# Patient Record
Sex: FEMALE | Race: BLACK OR AFRICAN AMERICAN | Employment: OTHER | URBAN - METROPOLITAN AREA
[De-identification: names, ages, dates, MRNs, and addresses within clinical notes are randomized per-mention and may not be internally consistent; named-entity substitution may affect disease eponyms.]

---

## 2017-01-04 ENCOUNTER — HOSPITAL ENCOUNTER (OUTPATIENT)
Dept: GENERAL RADIOLOGY | Age: 82
Discharge: HOME | End: 2017-01-04
Attending: INTERNAL MEDICINE
Payer: MEDICARE

## 2017-01-04 DIAGNOSIS — M25.551 RIGHT HIP PAIN: ICD-10-CM

## 2017-01-04 PROCEDURE — 73501 X-RAY EXAM HIP UNI 1 VIEW: CPT

## 2017-01-14 ENCOUNTER — HOSPITAL ENCOUNTER (EMERGENCY)
Age: 82
Discharge: HOME | End: 2017-01-14
Attending: INTERNAL MEDICINE | Admitting: INTERNAL MEDICINE
Payer: MEDICARE

## 2017-01-14 ENCOUNTER — APPOINTMENT (OUTPATIENT)
Dept: GENERAL RADIOLOGY | Age: 82
End: 2017-01-14
Attending: PHYSICIAN ASSISTANT
Payer: MEDICARE

## 2017-01-14 VITALS
HEART RATE: 70 BPM | TEMPERATURE: 98.7 F | WEIGHT: 198 LBS | DIASTOLIC BLOOD PRESSURE: 75 MMHG | SYSTOLIC BLOOD PRESSURE: 174 MMHG | HEIGHT: 64 IN | OXYGEN SATURATION: 100 % | BODY MASS INDEX: 33.8 KG/M2 | RESPIRATION RATE: 16 BRPM

## 2017-01-14 DIAGNOSIS — N39.0 UTI (URINARY TRACT INFECTION), UNCOMPLICATED: ICD-10-CM

## 2017-01-14 DIAGNOSIS — R10.84 ABDOMINAL PAIN, GENERALIZED: Primary | ICD-10-CM

## 2017-01-14 LAB
ALBUMIN SERPL BCP-MCNC: 3.6 G/DL (ref 3.4–5)
ALBUMIN/GLOB SERPL: 0.8 {RATIO} (ref 0.8–1.7)
ALP SERPL-CCNC: 136 U/L (ref 45–117)
ALT SERPL-CCNC: 21 U/L (ref 13–56)
ANION GAP BLD CALC-SCNC: 5 MMOL/L (ref 3–18)
APPEARANCE UR: CLEAR
AST SERPL W P-5'-P-CCNC: 18 U/L (ref 15–37)
BACTERIA URNS QL MICRO: ABNORMAL /HPF
BASOPHILS # BLD AUTO: 0 K/UL (ref 0–0.06)
BASOPHILS # BLD: 0 % (ref 0–2)
BILIRUB SERPL-MCNC: 0.3 MG/DL (ref 0.2–1)
BILIRUB UR QL: NEGATIVE
BUN SERPL-MCNC: 24 MG/DL (ref 7–18)
BUN/CREAT SERPL: 20 (ref 12–20)
CALCIUM SERPL-MCNC: 8.7 MG/DL (ref 8.5–10.1)
CHLORIDE SERPL-SCNC: 95 MMOL/L (ref 100–108)
CK MB CFR SERPL CALC: 1 % (ref 0–4)
CK MB SERPL-MCNC: 0.8 NG/ML (ref 0.5–3.6)
CK SERPL-CCNC: 79 U/L (ref 26–192)
CO2 SERPL-SCNC: 32 MMOL/L (ref 21–32)
COLOR UR: YELLOW
CREAT SERPL-MCNC: 1.18 MG/DL (ref 0.6–1.3)
DIFFERENTIAL METHOD BLD: ABNORMAL
EOSINOPHIL # BLD: 0.1 K/UL (ref 0–0.4)
EOSINOPHIL NFR BLD: 1 % (ref 0–5)
EPITH CASTS URNS QL MICRO: ABNORMAL /LPF (ref 0–5)
ERYTHROCYTE [DISTWIDTH] IN BLOOD BY AUTOMATED COUNT: 25.8 % (ref 11.6–14.5)
GLOBULIN SER CALC-MCNC: 4.5 G/DL (ref 2–4)
GLUCOSE SERPL-MCNC: 112 MG/DL (ref 74–99)
GLUCOSE UR STRIP.AUTO-MCNC: NEGATIVE MG/DL
HCT VFR BLD AUTO: 32.7 % (ref 35–45)
HGB BLD-MCNC: 10.8 G/DL (ref 12–16)
HGB UR QL STRIP: ABNORMAL
KETONES UR QL STRIP.AUTO: NEGATIVE MG/DL
LEUKOCYTE ESTERASE UR QL STRIP.AUTO: ABNORMAL
LIPASE SERPL-CCNC: 56 U/L (ref 73–393)
LYMPHOCYTES # BLD AUTO: 14 % (ref 21–52)
LYMPHOCYTES # BLD: 0.8 K/UL (ref 0.9–3.6)
MCH RBC QN AUTO: 23.9 PG (ref 24–34)
MCHC RBC AUTO-ENTMCNC: 33 G/DL (ref 31–37)
MCV RBC AUTO: 72.3 FL (ref 74–97)
MONOCYTES # BLD: 0.2 K/UL (ref 0.05–1.2)
MONOCYTES NFR BLD AUTO: 4 % (ref 3–10)
NEUTS SEG # BLD: 4.4 K/UL (ref 1.8–8)
NEUTS SEG NFR BLD AUTO: 81 % (ref 40–73)
NITRITE UR QL STRIP.AUTO: NEGATIVE
PH UR STRIP: 5 [PH] (ref 5–8)
PLATELET # BLD AUTO: 276 K/UL (ref 135–420)
PLATELET COMMENTS,PCOM: ABNORMAL
POTASSIUM SERPL-SCNC: 3.7 MMOL/L (ref 3.5–5.5)
PROT SERPL-MCNC: 8.1 G/DL (ref 6.4–8.2)
PROT UR STRIP-MCNC: NEGATIVE MG/DL
RBC # BLD AUTO: 4.52 M/UL (ref 4.2–5.3)
RBC #/AREA URNS HPF: ABNORMAL /HPF (ref 0–5)
RBC MORPH BLD: ABNORMAL
SODIUM SERPL-SCNC: 132 MMOL/L (ref 136–145)
SP GR UR REFRACTOMETRY: 1.01 (ref 1–1.03)
TROPONIN I SERPL-MCNC: 0.02 NG/ML (ref 0–0.06)
UROBILINOGEN UR QL STRIP.AUTO: 0.2 EU/DL (ref 0.2–1)
WBC # BLD AUTO: 5.5 K/UL (ref 4.6–13.2)
WBC URNS QL MICRO: ABNORMAL /HPF (ref 0–5)

## 2017-01-14 PROCEDURE — 93005 ELECTROCARDIOGRAM TRACING: CPT

## 2017-01-14 PROCEDURE — 74011250636 HC RX REV CODE- 250/636: Performed by: PHYSICIAN ASSISTANT

## 2017-01-14 PROCEDURE — 74022 RADEX COMPL AQT ABD SERIES: CPT

## 2017-01-14 PROCEDURE — 85025 COMPLETE CBC W/AUTO DIFF WBC: CPT | Performed by: PHYSICIAN ASSISTANT

## 2017-01-14 PROCEDURE — 80053 COMPREHEN METABOLIC PANEL: CPT | Performed by: PHYSICIAN ASSISTANT

## 2017-01-14 PROCEDURE — 81001 URINALYSIS AUTO W/SCOPE: CPT | Performed by: PHYSICIAN ASSISTANT

## 2017-01-14 PROCEDURE — 96375 TX/PRO/DX INJ NEW DRUG ADDON: CPT

## 2017-01-14 PROCEDURE — C9113 INJ PANTOPRAZOLE SODIUM, VIA: HCPCS | Performed by: PHYSICIAN ASSISTANT

## 2017-01-14 PROCEDURE — 99284 EMERGENCY DEPT VISIT MOD MDM: CPT

## 2017-01-14 PROCEDURE — 74011000258 HC RX REV CODE- 258: Performed by: PHYSICIAN ASSISTANT

## 2017-01-14 PROCEDURE — 96365 THER/PROPH/DIAG IV INF INIT: CPT

## 2017-01-14 PROCEDURE — 82550 ASSAY OF CK (CPK): CPT | Performed by: PHYSICIAN ASSISTANT

## 2017-01-14 PROCEDURE — 83690 ASSAY OF LIPASE: CPT | Performed by: PHYSICIAN ASSISTANT

## 2017-01-14 PROCEDURE — 87086 URINE CULTURE/COLONY COUNT: CPT | Performed by: PHYSICIAN ASSISTANT

## 2017-01-14 PROCEDURE — 96361 HYDRATE IV INFUSION ADD-ON: CPT

## 2017-01-14 RX ORDER — PANTOPRAZOLE SODIUM 40 MG/10ML
40 INJECTION, POWDER, LYOPHILIZED, FOR SOLUTION INTRAVENOUS
Status: COMPLETED | OUTPATIENT
Start: 2017-01-14 | End: 2017-01-14

## 2017-01-14 RX ORDER — ONDANSETRON 2 MG/ML
4 INJECTION INTRAMUSCULAR; INTRAVENOUS
Status: COMPLETED | OUTPATIENT
Start: 2017-01-14 | End: 2017-01-14

## 2017-01-14 RX ORDER — CEPHALEXIN 500 MG/1
500 CAPSULE ORAL 2 TIMES DAILY
Qty: 14 CAP | Refills: 0 | Status: SHIPPED | OUTPATIENT
Start: 2017-01-14 | End: 2017-01-21

## 2017-01-14 RX ADMIN — SODIUM CHLORIDE 1000 ML: 900 INJECTION, SOLUTION INTRAVENOUS at 20:20

## 2017-01-14 RX ADMIN — CEFTRIAXONE 1 G: 1 INJECTION, POWDER, FOR SOLUTION INTRAMUSCULAR; INTRAVENOUS at 21:19

## 2017-01-14 RX ADMIN — PANTOPRAZOLE SODIUM 40 MG: 40 INJECTION, POWDER, FOR SOLUTION INTRAVENOUS at 20:20

## 2017-01-14 RX ADMIN — ONDANSETRON 4 MG: 2 INJECTION INTRAMUSCULAR; INTRAVENOUS at 20:23

## 2017-01-14 NOTE — ED PROVIDER NOTES
HPI Comments: 6:13 PM   Natasha Hernandez is a 80 y.o. female presenting to the ED C/O generalized abdominal pain (8/10), onset 3 weeks ago. Symptoms include abdominal distention, nausea, vomiting x2 (7 days ago), sore throat, and constipation. Pt was seen at [facility] twice for the same complaint and had a negative CT scan and EKG done, and was diagnosed with colitis. Pt was prescribed two antibiotics without relief. Pt takes 5 Percocet daily for arthritis. SHx cholecystomy. Pt denies blood in the stool, dysuria, fever, chest pain, SOB, and any other Sx or complaints. Written by Rivera Mendez, ED Scribe, as dictated by Kieran Aguilar PA-C. Patient is a 80 y.o. female presenting with abdominal pain. The history is provided by the patient. No  was used. Abdominal Pain    This is a new problem. The current episode started more than 1 week ago (3 weeks ago). The pain is located in the RUQ and LUQ. Associated symptoms include nausea, vomiting and constipation. Pertinent negatives include no fever, no dysuria and no chest pain. Past Medical History:   Diagnosis Date    Arthritis     GERD (gastroesophageal reflux disease)      good control    Hypertension 50yrs    Ill-defined condition      seasonal allergies    Other ill-defined conditions(799.89)      glaucoma    Other ill-defined conditions(799.89)      bilateral pedal and ankle edema    Seizures ([address]      last seizure greater than 3 years ago.  Stroke ([address]      \"years ago, I didn't even know it\"    Thyroid disease     Unspecified adverse effect of anesthesia      hard to awaken       Past Surgical History:   Procedure Laterality Date    Hx mohs procedure       left    Hx orthopaedic       left bunionectomy    Pr total knee arthroplasty       right    Hx cholecystectomy           History reviewed. No pertinent family history.     Social History     Social History    Marital status:      Spouse name: N/A   Patricio Craig Number of children: N/A    Years of education: N/A     Occupational History    Not on file. Social History Main Topics    Smoking status: Former Smoker     Packs/day: 0.50     Years: 64.00     Quit date: 1/1/2014    Smokeless tobacco: Never Used      Comment: quit 2013    Alcohol use 0.5 oz/week     1 Standard drinks or equivalent per week      Comment: sip once a month    Drug use: No    Sexual activity: Not on file     Other Topics Concern    Not on file     Social History Narrative         ALLERGIES: Review of patient's allergies indicates no known allergies. Review of Systems   Constitutional: Negative for fever. HENT: Positive for sore throat. Respiratory: Negative for shortness of breath. Cardiovascular: Negative for chest pain. Gastrointestinal: Positive for abdominal distention, abdominal pain (Generalized), constipation, nausea and vomiting. Genitourinary: Negative for dysuria. All other systems reviewed and are negative. Vitals:    01/14/17 1810 01/14/17 2024 01/14/17 2048   BP: 186/81 179/82 173/69   Pulse: 77 71 70   Resp: 20 18 16   Temp: 98.7 °F (37.1 °C)     SpO2: 100% 99% 100%   Weight: 89.8 kg (198 lb)     Height: 5' 4\" (1.626 m)              Physical Exam   Constitutional: She is oriented to person, place, and time. She appears well-developed and well-nourished. No distress. HENT:   Head: Normocephalic and atraumatic. Eyes: Conjunctivae and EOM are normal. Pupils are equal, round, and reactive to light. Neck: Normal range of motion. Neck supple. Cardiovascular: Normal rate and regular rhythm. Pulmonary/Chest: Effort normal and breath sounds normal.   Abdominal: Soft. Bowel sounds are normal. She exhibits no distension. There is no tenderness. There is no rebound and no guarding. Musculoskeletal: Normal range of motion. Neurological: She is alert and oriented to person, place, and time. Skin: Skin is warm and dry.    Psychiatric: She has a normal mood and affect. Her behavior is normal.   Nursing note and vitals reviewed. RESULTS:    PULSE OXIMETRY NOTE:  6:28 PM   Pulse-ox is 100% on room air  Interpretation: Normal  Intervention: None   Written by EMILY Vallejoibketurah, as dictated by Wyatt Gomez MD    EKG interpretation: (Preliminary)  8:39 PM   NSR. Rate: 70 bpm. Normal ECG  EKG read by Elgin Kingston PA-C at 8:28 PM     CARDIAC MONITOR NOTE:  8:30 PM   Cardiac Rhythm: NSR  Rate: 71 bpm  Intervention: None   Written by EMILY Vallejoibe, as dictated by Elgin Kingston PA-C.     XR ABD ACUTE W 1 V CHEST    (Results Pending)        Labs Reviewed   CBC WITH AUTOMATED DIFF - Abnormal; Notable for the following:        Result Value    HGB 10.8 (*)     HCT 32.7 (*)     MCV 72.3 (*)     MCH 23.9 (*)     RDW 25.8 (*)     NEUTROPHILS 81 (*)     LYMPHOCYTES 14 (*)     ABS. LYMPHOCYTES 0.8 (*)     All other components within normal limits   METABOLIC PANEL, COMPREHENSIVE - Abnormal; Notable for the following:     Sodium 132 (*)     Chloride 95 (*)     Glucose 112 (*)     BUN 24 (*)     GFR est AA 51 (*)     GFR est non-AA 42 (*)     Alk.  phosphatase 136 (*)     Globulin 4.5 (*)     All other components within normal limits   URINALYSIS W/ RFLX MICROSCOPIC - Abnormal; Notable for the following:     Blood TRACE (*)     Leukocyte Esterase MODERATE (*)     All other components within normal limits   LIPASE - Abnormal; Notable for the following:     Lipase 56 (*)     All other components within normal limits   URINE MICROSCOPIC ONLY - Abnormal; Notable for the following:     Bacteria 2+ (*)     All other components within normal limits   CULTURE, URINE   CARDIAC PANEL,(CK, CKMB & TROPONIN)       Recent Results (from the past 12 hour(s))   URINALYSIS W/ RFLX MICROSCOPIC    Collection Time: 01/14/17  6:45 PM   Result Value Ref Range    Color YELLOW      Appearance CLEAR      Specific gravity 1.013 1.005 - 1.030      pH (UA) 5.0 5.0 - 8.0      Protein NEGATIVE  NEG mg/dL    Glucose NEGATIVE  NEG mg/dL    Ketone NEGATIVE  NEG mg/dL    Bilirubin NEGATIVE  NEG      Blood TRACE (A) NEG      Urobilinogen 0.2 0.2 - 1.0 EU/dL    Nitrites NEGATIVE  NEG      Leukocyte Esterase MODERATE (A) NEG     URINE MICROSCOPIC ONLY    Collection Time: 01/14/17  6:45 PM   Result Value Ref Range    WBC 4 to 10 0 - 5 /hpf    RBC 0 to 3 0 - 5 /hpf    Epithelial cells 1+ 0 - 5 /lpf    Bacteria 2+ (A) NEG /hpf   CBC WITH AUTOMATED DIFF    Collection Time: 01/14/17  7:25 PM   Result Value Ref Range    WBC 5.5 4.6 - 13.2 K/uL    RBC 4.52 4.20 - 5.30 M/uL    HGB 10.8 (L) 12.0 - 16.0 g/dL    HCT 32.7 (L) 35.0 - 45.0 %    MCV 72.3 (L) 74.0 - 97.0 FL    MCH 23.9 (L) 24.0 - 34.0 PG    MCHC 33.0 31.0 - 37.0 g/dL    RDW 25.8 (H) 11.6 - 14.5 %    PLATELET 066 011 - 115 K/uL    NEUTROPHILS 81 (H) 40 - 73 %    LYMPHOCYTES 14 (L) 21 - 52 %    MONOCYTES 4 3 - 10 %    EOSINOPHILS 1 0 - 5 %    BASOPHILS 0 0 - 2 %    ABS. NEUTROPHILS 4.4 1.8 - 8.0 K/UL    ABS. LYMPHOCYTES 0.8 (L) 0.9 - 3.6 K/UL    ABS. MONOCYTES 0.2 0.05 - 1.2 K/UL    ABS. EOSINOPHILS 0.1 0.0 - 0.4 K/UL    ABS.  BASOPHILS 0.0 0.0 - 0.06 K/UL    PLATELET COMMENTS LARGE PLATELETS      RBC COMMENTS ANISOCYTOSIS  3+        RBC COMMENTS POIKILOCYTOSIS  3+        RBC COMMENTS        ELLIPTOCYTES 2+  TEARDROP CELLS  OCCASIONAL  SCHISTOCYTES  RARE  MADDISON CELLS  OCCASIONAL      DF AUTOMATED     METABOLIC PANEL, COMPREHENSIVE    Collection Time: 01/14/17  7:25 PM   Result Value Ref Range    Sodium 132 (L) 136 - 145 mmol/L    Potassium 3.7 3.5 - 5.5 mmol/L    Chloride 95 (L) 100 - 108 mmol/L    CO2 32 21 - 32 mmol/L    Anion gap 5 3.0 - 18 mmol/L    Glucose 112 (H) 74 - 99 mg/dL    BUN 24 (H) 7.0 - 18 MG/DL    Creatinine 1.18 0.6 - 1.3 MG/DL    BUN/Creatinine ratio 20 12 - 20      GFR est AA 51 (L) >60 ml/min/1.73m2    GFR est non-AA 42 (L) >60 ml/min/1.73m2    Calcium 8.7 8.5 - 10.1 MG/DL    Bilirubin, total 0.3 0.2 - 1.0 MG/DL    ALT 21 13 - 56 U/L AST 18 15 - 37 U/L    Alk. phosphatase 136 (H) 45 - 117 U/L    Protein, total 8.1 6.4 - 8.2 g/dL    Albumin 3.6 3.4 - 5.0 g/dL    Globulin 4.5 (H) 2.0 - 4.0 g/dL    A-G Ratio 0.8 0.8 - 1.7     LIPASE    Collection Time: 01/14/17  7:25 PM   Result Value Ref Range    Lipase 56 (L) 73 - 393 U/L   CARDIAC PANEL,(CK, CKMB & TROPONIN)    Collection Time: 01/14/17  7:25 PM   Result Value Ref Range    CK 79 26 - 192 U/L    CK - MB 0.8 0.5 - 3.6 ng/ml    CK-MB Index 1.0 0.0 - 4.0 %    Troponin-I, Qt. 0.02 0.00 - 0.06 NG/ML   EKG, 12 LEAD, INITIAL    Collection Time: 01/14/17  8:28 PM   Result Value Ref Range    Ventricular Rate 70 BPM    Atrial Rate 70 BPM    P-R Interval 172 ms    QRS Duration 82 ms    Q-T Interval 412 ms    QTC Calculation (Bezet) 444 ms    Calculated P Axis 66 degrees    Calculated R Axis 0 degrees    Calculated T Axis 34 degrees    Diagnosis       Normal sinus rhythm  Normal ECG  When compared with ECG of 17-DEC-2014 12:24,  No significant change was found          MDM  Number of Diagnoses or Management Options     Amount and/or Complexity of Data Reviewed  Clinical lab tests: ordered and reviewed  Tests in the radiology section of CPT®: ordered and reviewed (XR ABD)  Tests in the medicine section of CPT®: ordered and reviewed (EKG)  Independent visualization of images, tracings, or specimens: yes (EKG, XR ABD)      ED Course     MEDICATIONS GIVEN:  Medications   cefTRIAXone (ROCEPHIN) 1 g in 0.9% sodium chloride (MBP/ADV) 50 mL MBP (not administered)   sodium chloride 0.9 % bolus infusion 1,000 mL (1,000 mL IntraVENous New Bag 1/14/17 2020)   ondansetron (ZOFRAN) injection 4 mg (4 mg IntraVENous Given 1/14/17 2023)   pantoprazole (PROTONIX) injection 40 mg (40 mg IntraVENous Given 1/14/17 2020)        Procedures    PROGRESS NOTE:   6:13 PM  Initial assessment performed.    Written by Danny Stone ED Scribe, as dictated by Sherrilee Spittle, PA-C.    DISCHARGE NOTE:  9:15 PM   [address]  results have been reviewed with her. She has been counseled regarding her diagnosis, treatment, and plan. She verbally conveys understanding and agreement of the signs, symptoms, diagnosis, treatment and prognosis and additionally agrees to follow up as discussed. She also agrees with the care-plan and conveys that all of her questions have been answered. I have also provided discharge instructions for her that include: educational information regarding their diagnosis and treatment, and list of reasons why they would want to return to the ED prior to their follow-up appointment, should her condition change. The patient and/or family have been provided with education for proper Emergency Department utilization. CLINICAL IMPRESSION:    1. Abdominal pain, generalized    2. UTI (urinary tract infection), uncomplicated        AFTER VISIT PLAN:    Current Discharge Medication List      START taking these medications    Details   cephALEXin (KEFLEX) 500 mg capsule Take 1 Cap by mouth two (2) times a day for 7 days. Qty: 14 Cap, Refills: 0         CONTINUE these medications which have NOT CHANGED    Details   oxyCODONE-acetaminophen (PERCOCET) 5-325 mg per tablet Take 1 tablet by mouth every six (6) hours as needed for Pain. !! OXcarbazepine (TRILEPTAL) 300 mg tablet Take 600 mg by mouth nightly. amLODIPine (NORVASC) 10 mg tablet Take 10 mg by mouth daily. Pt instructed to take with a small bit of water on DOS      !! OXcarbazepine (TRILEPTAL) 300 mg tablet Take 300 mg by mouth daily (after breakfast). levothyroxine (SYNTHROID) 50 mcg tablet Take 50 mcg by mouth Daily (before breakfast). Pt instructed to take with a small bit of water on DOS      carvedilol (COREG) 3.125 mg tablet Take 6.25 mg by mouth two (2) times daily (with meals). Pt instructed to take with a small bit of water on DOS      omeprazole (PRILOSEC) 20 mg capsule Take 20 mg by mouth daily.  Pt instructed to take with a small bit of water on DOS      cloNIDine (CATAPRESS) 0.2 mg tablet Take 0.2 mg by mouth two (2) times a day. Pt instructed to take with a small bit of water on DOS      aspirin 81 mg tablet Take 81 mg by mouth daily. Cholecalciferol, Vitamin D3, (VITAMIN D3) 1,000 unit cap Take 1,000 Units by mouth daily. polyethylene glycol (MIRALAX) 17 gram packet Take 17 g by mouth two (2) times a day. brimonidine-timolol (COMBIGAN) 0.2-0.5 % Drop ophthalmic solution Administer 1 Drop to both eyes every twelve (12) hours. levofloxacin (LEVAQUIN) 500 mg tablet Take  by mouth daily. amoxicillin (AMOXIL) 500 mg capsule Take 1,000 mg by mouth once as needed. One hour prior to dental appt      LORATADINE (CLARITIN PO) Take 10 mg by mouth daily. potassium chloride (K-DUR, KLOR-CON) 20 mEq tablet Take 20 mEq by mouth daily. ibandronate (BONIVA) 150 mg tablet Take 150 mg by mouth every thirty (30) days. !! - Potential duplicate medications found. Please discuss with provider. Follow-up Information     Follow up With Details Comments Contact Info    Daisy Yusuf MD Schedule an appointment as soon as possible for a visit  [address]      THE [facility] EMERGENCY DEPT  If symptoms worsen, As needed [address] [id number] [phone]           Attestations: This note is prepared by Laura Corado, acting as Scribe for Kieran Aguilar PA-C. Kieran Aguilar PA-C: The scribe's documentation has been prepared under my direction and personally reviewed by me in its entirety. I confirm that the note above accurately reflects all work, treatment, procedures, and medical decision making performed by me.

## 2017-01-14 NOTE — ED TRIAGE NOTES
amb into ed w/ c/o's abd pain onset xmas ariana - started on LUQ and moved across abd to RUQ - feels like a big balloon per pt - vomited x 2 this past Tuesday - otherwise no n/v/d reported - seen at [facility] x 2 for same c/o - Ct scan done/EKG done - told they did not see anything and released home.

## 2017-01-15 LAB
ATRIAL RATE: 70 BPM
CALCULATED P AXIS, ECG09: 66 DEGREES
CALCULATED R AXIS, ECG10: 0 DEGREES
CALCULATED T AXIS, ECG11: 34 DEGREES
DIAGNOSIS, 93000: NORMAL
P-R INTERVAL, ECG05: 172 MS
Q-T INTERVAL, ECG07: 412 MS
QRS DURATION, ECG06: 82 MS
QTC CALCULATION (BEZET), ECG08: 444 MS
VENTRICULAR RATE, ECG03: 70 BPM

## 2017-01-15 NOTE — DISCHARGE INSTRUCTIONS
Abdominal Pain: Care Instructions  Your Care Instructions    Abdominal pain has many possible causes. Some aren't serious and get better on their own in a few days. Others need more testing and treatment. If your pain continues or gets worse, you need to be rechecked and may need more tests to find out what is wrong. You may need surgery to correct the problem. Don't ignore new symptoms, such as fever, nausea and vomiting, urination problems, pain that gets worse, and dizziness. These may be signs of a more serious problem. Your doctor may have recommended a follow-up visit in the next 8 to 12 hours. If you are not getting better, you may need more tests or treatment. The doctor has checked you carefully, but problems can develop later. If you notice any problems or new symptoms, get medical treatment right away. Follow-up care is a key part of your treatment and safety. Be sure to make and go to all appointments, and call your doctor if you are having problems. It's also a good idea to know your test results and keep a list of the medicines you take. How can you care for yourself at home? · Rest until you feel better. · To prevent dehydration, drink plenty of fluids, enough so that your urine is light yellow or clear like water. Choose water and other caffeine-free clear liquids until you feel better. If you have kidney, heart, or liver disease and have to limit fluids, talk with your doctor before you increase the amount of fluids you drink. · If your stomach is upset, eat mild foods, such as rice, dry toast or crackers, bananas, and applesauce. Try eating several small meals instead of two or three large ones. · Wait until 48 hours after all symptoms have gone away before you have spicy foods, alcohol, and drinks that contain caffeine. · Do not eat foods that are high in fat. · Avoid anti-inflammatory medicines such as aspirin, ibuprofen (Advil, Motrin), and naproxen (Aleve).  These can cause stomach upset. Talk to your doctor if you take daily aspirin for another health problem. When should you call for help? Call 911 anytime you think you may need emergency care. For example, call if:  · You passed out (lost consciousness). · You pass maroon or very bloody stools. · You vomit blood or what looks like coffee grounds. · You have new, severe belly pain. Call your doctor now or seek immediate medical care if:  · Your pain gets worse, especially if it becomes focused in one area of your belly. · You have a new or higher fever. · Your stools are black and look like tar, or they have streaks of blood. · You have unexpected vaginal bleeding. · You have symptoms of a urinary tract infection. These may include:  ¨ Pain when you urinate. ¨ Urinating more often than usual.  ¨ Blood in your urine. · You are dizzy or lightheaded, or you feel like you may faint. Watch closely for changes in your health, and be sure to contact your doctor if:  · You are not getting better after 1 day (24 hours). Where can you learn more? Go to http://elsaIfinityerickson.info/. Enter W575 in the search box to learn more about \"Abdominal Pain: Care Instructions. \"  Current as of: May 27, 2016  Content Version: 11.1  © 5758-8666 [employer]. Care instructions adapted under license by [employer] (which disclaims liability or warranty for this information). If you have questions about a medical condition or this instruction, always ask your healthcare professional. [place] [address] any warranty or liability for your use of this information. Urinary Tract Infection in Women: Care Instructions  Your Care Instructions    A urinary tract infection, or UTI, is a general term for an infection anywhere between the kidneys and the urethra (where urine comes out). Most UTIs are bladder infections. They often cause pain or burning when you urinate.   UTIs are caused by bacteria and can be cured with antibiotics. Be sure to complete your treatment so that the infection goes away. Follow-up care is a key part of your treatment and safety. Be sure to make and go to all appointments, and call your doctor if you are having problems. It's also a good idea to know your test results and keep a list of the medicines you take. How can you care for yourself at home? · Take your antibiotics as directed. Do not stop taking them just because you feel better. You need to take the full course of antibiotics. · Drink extra water and other fluids for the next day or two. This may help wash out the bacteria that are causing the infection. (If you have kidney, heart, or liver disease and have to limit fluids, talk with your doctor before you increase your fluid intake.)  · Avoid drinks that are carbonated or have caffeine. They can irritate the bladder. · Urinate often. Try to empty your bladder each time. · To relieve pain, take a hot bath or lay a heating pad set on low over your lower belly or genital area. Never go to sleep with a heating pad in place. To prevent UTIs  · Drink plenty of water each day. This helps you urinate often, which clears bacteria from your system. (If you have kidney, heart, or liver disease and have to limit fluids, talk with your doctor before you increase your fluid intake.)  · Consider adding cranberry juice to your diet. · Urinate when you need to. · Urinate right after you have sex. · Change sanitary pads often. · Avoid douches, bubble baths, feminine hygiene sprays, and other feminine hygiene products that have deodorants. · After going to the bathroom, wipe from front to back. When should you call for help? Call your doctor now or seek immediate medical care if:  · Symptoms such as fever, chills, nausea, or vomiting get worse or appear for the first time. · You have new pain in your back just below your rib cage. This is called flank pain.   · There is new blood or pus in your urine. · You have any problems with your antibiotic medicine. Watch closely for changes in your health, and be sure to contact your doctor if:  · You are not getting better after taking an antibiotic for 2 days. · Your symptoms go away but then come back. Where can you learn more? Go to http://elsa-erickson.info/. Enter P341 in the search box to learn more about \"Urinary Tract Infection in Women: Care Instructions. \"  Current as of: August 12, 2016  Content Version: 11.1  © 1263-5800 [employer]. Care instructions adapted under license by [employer] (which disclaims liability or warranty for this information). If you have questions about a medical condition or this instruction, always ask your healthcare professional. [address] any warranty or liability for your use of this information.

## 2017-01-16 LAB
BACTERIA SPEC CULT: NORMAL
SERVICE CMNT-IMP: NORMAL

## 2017-05-17 ENCOUNTER — HOSPITAL ENCOUNTER (EMERGENCY)
Age: 82
Discharge: HOME | End: 2017-05-17
Attending: EMERGENCY MEDICINE | Admitting: EMERGENCY MEDICINE
Payer: MEDICARE

## 2017-05-17 VITALS
TEMPERATURE: 98.1 F | OXYGEN SATURATION: 100 % | SYSTOLIC BLOOD PRESSURE: 175 MMHG | HEIGHT: 66 IN | BODY MASS INDEX: 32.14 KG/M2 | HEART RATE: 67 BPM | RESPIRATION RATE: 13 BRPM | DIASTOLIC BLOOD PRESSURE: 71 MMHG | WEIGHT: 200 LBS

## 2017-05-17 DIAGNOSIS — I10 ELEVATED SYSTOLIC BLOOD PRESSURE READING WITH DIAGNOSIS OF HYPERTENSION: Primary | ICD-10-CM

## 2017-05-17 DIAGNOSIS — R53.81 MALAISE AND FATIGUE: ICD-10-CM

## 2017-05-17 DIAGNOSIS — R53.83 MALAISE AND FATIGUE: ICD-10-CM

## 2017-05-17 LAB
ALBUMIN SERPL BCP-MCNC: 3.7 G/DL (ref 3.4–5)
ALBUMIN/GLOB SERPL: 0.7 {RATIO} (ref 0.8–1.7)
ALP SERPL-CCNC: 138 U/L (ref 45–117)
ALT SERPL-CCNC: 13 U/L (ref 13–56)
ANION GAP BLD CALC-SCNC: 9 MMOL/L (ref 3–18)
APPEARANCE UR: CLEAR
AST SERPL W P-5'-P-CCNC: 13 U/L (ref 15–37)
ATRIAL RATE: 68 BPM
BASOPHILS # BLD AUTO: 0.1 K/UL (ref 0–0.06)
BASOPHILS # BLD: 1 % (ref 0–2)
BILIRUB SERPL-MCNC: 0.3 MG/DL (ref 0.2–1)
BILIRUB UR QL: NEGATIVE
BUN SERPL-MCNC: 19 MG/DL (ref 7–18)
BUN/CREAT SERPL: 19 (ref 12–20)
CALCIUM SERPL-MCNC: 8.9 MG/DL (ref 8.5–10.1)
CALCULATED P AXIS, ECG09: 63 DEGREES
CALCULATED R AXIS, ECG10: -17 DEGREES
CALCULATED T AXIS, ECG11: 9 DEGREES
CHLORIDE SERPL-SCNC: 96 MMOL/L (ref 100–108)
CK MB CFR SERPL CALC: NORMAL % (ref 0–4)
CK MB SERPL-MCNC: <1 NG/ML (ref 5–25)
CK SERPL-CCNC: 46 U/L (ref 26–192)
CO2 SERPL-SCNC: 29 MMOL/L (ref 21–32)
COLOR UR: YELLOW
CREAT SERPL-MCNC: 0.98 MG/DL (ref 0.6–1.3)
DIAGNOSIS, 93000: NORMAL
DIFFERENTIAL METHOD BLD: ABNORMAL
EOSINOPHIL # BLD: 0.1 K/UL (ref 0–0.4)
EOSINOPHIL NFR BLD: 1 % (ref 0–5)
ERYTHROCYTE [DISTWIDTH] IN BLOOD BY AUTOMATED COUNT: 25.2 % (ref 11.6–14.5)
GLOBULIN SER CALC-MCNC: 5 G/DL (ref 2–4)
GLUCOSE SERPL-MCNC: 153 MG/DL (ref 74–99)
GLUCOSE UR STRIP.AUTO-MCNC: NEGATIVE MG/DL
HCT VFR BLD AUTO: 31 % (ref 35–45)
HGB BLD-MCNC: 10.7 G/DL (ref 12–16)
HGB UR QL STRIP: NEGATIVE
KETONES UR QL STRIP.AUTO: NEGATIVE MG/DL
LEUKOCYTE ESTERASE UR QL STRIP.AUTO: NEGATIVE
LYMPHOCYTES # BLD AUTO: 8 % (ref 21–52)
LYMPHOCYTES # BLD: 0.4 K/UL (ref 0.9–3.6)
MCH RBC QN AUTO: 24.9 PG (ref 24–34)
MCHC RBC AUTO-ENTMCNC: 34.5 G/DL (ref 31–37)
MCV RBC AUTO: 72.1 FL (ref 74–97)
MONOCYTES # BLD: 0.5 K/UL (ref 0.05–1.2)
MONOCYTES NFR BLD AUTO: 10 % (ref 3–10)
NEUTS SEG # BLD: 4.2 K/UL (ref 1.8–8)
NEUTS SEG NFR BLD AUTO: 80 % (ref 40–73)
NITRITE UR QL STRIP.AUTO: NEGATIVE
P-R INTERVAL, ECG05: 166 MS
PH UR STRIP: 7 [PH] (ref 5–8)
PLATELET # BLD AUTO: 276 K/UL (ref 135–420)
POTASSIUM SERPL-SCNC: 3.5 MMOL/L (ref 3.5–5.5)
PROT SERPL-MCNC: 8.7 G/DL (ref 6.4–8.2)
PROT UR STRIP-MCNC: NEGATIVE MG/DL
Q-T INTERVAL, ECG07: 402 MS
QRS DURATION, ECG06: 82 MS
QTC CALCULATION (BEZET), ECG08: 427 MS
RBC # BLD AUTO: 4.3 M/UL (ref 4.2–5.3)
RBC MORPH BLD: ABNORMAL
SODIUM SERPL-SCNC: 134 MMOL/L (ref 136–145)
SP GR UR REFRACTOMETRY: 1.01 (ref 1–1.03)
TROPONIN I SERPL-MCNC: <0.02 NG/ML (ref 0–0.06)
UROBILINOGEN UR QL STRIP.AUTO: 0.2 EU/DL (ref 0.2–1)
VENTRICULAR RATE, ECG03: 68 BPM
WBC # BLD AUTO: 5.3 K/UL (ref 4.6–13.2)

## 2017-05-17 PROCEDURE — 93005 ELECTROCARDIOGRAM TRACING: CPT

## 2017-05-17 PROCEDURE — 94762 N-INVAS EAR/PLS OXIMTRY CONT: CPT

## 2017-05-17 PROCEDURE — 82550 ASSAY OF CK (CPK): CPT | Performed by: EMERGENCY MEDICINE

## 2017-05-17 PROCEDURE — 80053 COMPREHEN METABOLIC PANEL: CPT | Performed by: EMERGENCY MEDICINE

## 2017-05-17 PROCEDURE — 81003 URINALYSIS AUTO W/O SCOPE: CPT | Performed by: EMERGENCY MEDICINE

## 2017-05-17 PROCEDURE — 85025 COMPLETE CBC W/AUTO DIFF WBC: CPT | Performed by: EMERGENCY MEDICINE

## 2017-05-17 PROCEDURE — 74011250637 HC RX REV CODE- 250/637: Performed by: PHYSICIAN ASSISTANT

## 2017-05-17 PROCEDURE — 99285 EMERGENCY DEPT VISIT HI MDM: CPT

## 2017-05-17 RX ORDER — CARVEDILOL 3.12 MG/1
6.25 TABLET ORAL
Status: COMPLETED | OUTPATIENT
Start: 2017-05-17 | End: 2017-05-17

## 2017-05-17 RX ORDER — CLONIDINE HYDROCHLORIDE 0.1 MG/1
0.2 TABLET ORAL
Status: COMPLETED | OUTPATIENT
Start: 2017-05-17 | End: 2017-05-17

## 2017-05-17 RX ORDER — CLONIDINE HYDROCHLORIDE 0.3 MG/1
0.3 TABLET ORAL 2 TIMES DAILY
Qty: 28 TAB | Refills: 0 | Status: SHIPPED | OUTPATIENT
Start: 2017-05-17 | End: 2017-05-31

## 2017-05-17 RX ADMIN — CLONIDINE HYDROCHLORIDE 0.2 MG: 0.1 TABLET ORAL at 13:11

## 2017-05-17 RX ADMIN — CARVEDILOL 6.25 MG: 3.12 TABLET, FILM COATED ORAL at 11:41

## 2017-05-17 NOTE — ED NOTES
Pt resting in stretcher with HOB raised, in NAD. Pt remains on CCM. Warm blankets and pillow provided for pt comfort. Pt's family remains at bedside.

## 2017-05-17 NOTE — ED NOTES
Discharge instructions reviewed with the patient with opportunity for questions given. The patient verbalized understanding. Patient armband removed and shredded. Patient in stable condition at time of discharge. Ride to bedside.

## 2017-05-17 NOTE — ED TRIAGE NOTES
C/o dizziness, fatigue for several days, elevated BP this AM.     Sepsis Screening completed    (  )Patient meets SIRS criteria. ( x )Patient does not meet SIRS criteria.       SIRS Criteria is achieved when two or more of the following are present   Temperature < 96.8°F (36°C) or > 100.9°F (38.3°C)   Heart Rate > 90 beats per minute   Respiratory Rate > 20 breaths per minute   WBC count > 12,000 or <4,000 or > 10% bands

## 2017-05-17 NOTE — ED NOTES
Pt tolerated orthostatic VS well. Pt reports minimal dizziness when moving positions and is noted to stand without any noted difficulty. Pt calm and cooperative.

## 2017-05-17 NOTE — DISCHARGE INSTRUCTIONS
High Blood Pressure: Care Instructions  Your Care Instructions  If your blood pressure is usually above 140/90, you have high blood pressure, or hypertension. That means the top number is 140 or higher or the bottom number is 90 or higher, or both. Despite what a lot of people think, high blood pressure usually doesn't cause headaches or make you feel dizzy or lightheaded. It usually has no symptoms. But it does increase your risk for heart attack, stroke, and kidney or eye damage. The higher your blood pressure, the more your risk increases. Your doctor will give you a goal for your blood pressure. Your goal will be based on your health and your age. An example of a goal is to keep your blood pressure below 140/90. Lifestyle changes, such as eating healthy and being active, are always important to help lower blood pressure. You might also take medicine to reach your blood pressure goal.  Follow-up care is a key part of your treatment and safety. Be sure to make and go to all appointments, and call your doctor if you are having problems. It's also a good idea to know your test results and keep a list of the medicines you take. How can you care for yourself at home? Medical treatment  · If you stop taking your medicine, your blood pressure will go back up. You may take one or more types of medicine to lower your blood pressure. Be safe with medicines. Take your medicine exactly as prescribed. Call your doctor if you think you are having a problem with your medicine. · Talk to your doctor before you start taking aspirin every day. Aspirin can help certain people lower their risk of a heart attack or stroke. But taking aspirin isn't right for everyone, because it can cause serious bleeding. · See your doctor regularly. You may need to see the doctor more often at first or until your blood pressure comes down.   · If you are taking blood pressure medicine, talk to your doctor before you take decongestants or anti-inflammatory medicine, such as ibuprofen. Some of these medicines can raise blood pressure. · Learn how to check your blood pressure at home. Lifestyle changes  · Stay at a healthy weight. This is especially important if you put on weight around the waist. Losing even 10 pounds can help you lower your blood pressure. · If your doctor recommends it, get more exercise. Walking is a good choice. Bit by bit, increase the amount you walk every day. Try for at least 30 minutes on most days of the week. You also may want to swim, bike, or do other activities. · Avoid or limit alcohol. Talk to your doctor about whether you can drink any alcohol. · Try to limit how much sodium you eat to less than 2,300 milligrams (mg) a day. Your doctor may ask you to try to eat less than 1,500 mg a day. · Eat plenty of fruits (such as bananas and oranges), vegetables, legumes, whole grains, and low-fat dairy products. · Lower the amount of saturated fat in your diet. Saturated fat is found in animal products such as milk, cheese, and meat. Limiting these foods may help you lose weight and also lower your risk for heart disease. · Do not smoke. Smoking increases your risk for heart attack and stroke. If you need help quitting, talk to your doctor about stop-smoking programs and medicines. These can increase your chances of quitting for good. When should you call for help? Call 911 anytime you think you may need emergency care. This may mean having symptoms that suggest that your blood pressure is causing a serious heart or blood vessel problem. Your blood pressure may be over 180/110. For example, call 911 if:  · You have symptoms of a heart attack. These may include:  ¨ Chest pain or pressure, or a strange feeling in the chest.  ¨ Sweating. ¨ Shortness of breath. ¨ Nausea or vomiting. ¨ Pain, pressure, or a strange feeling in the back, neck, jaw, or upper belly or in one or both shoulders or arms.   ¨ Lightheadedness or sudden weakness. ¨ A fast or irregular heartbeat. · You have symptoms of a stroke. These may include:  ¨ Sudden numbness, tingling, weakness, or loss of movement in your face, arm, or leg, especially on only one side of your body. ¨ Sudden vision changes. ¨ Sudden trouble speaking. ¨ Sudden confusion or trouble understanding simple statements. ¨ Sudden problems with walking or balance. ¨ A sudden, severe headache that is different from past headaches. · You have severe back or belly pain. Do not wait until your blood pressure comes down on its own. Get help right away. Call your doctor now or seek immediate care if:  · Your blood pressure is much higher than normal (such as 180/110 or higher), but you don't have symptoms. · You think high blood pressure is causing symptoms, such as:  ¨ Severe headache. ¨ Blurry vision. Watch closely for changes in your health, and be sure to contact your doctor if:  · Your blood pressure measures 140/90 or higher at least 2 times. That means the top number is 140 or higher or the bottom number is 90 or higher, or both. · You think you may be having side effects from your blood pressure medicine. · Your blood pressure is usually normal, but it goes above normal at least 2 times. Where can you learn more? Go to http://elsa-erickson.info/. Enter U116 in the search box to learn more about \"High Blood Pressure: Care Instructions. \"  Current as of: August 8, 2016  Content Version: 11.2  © 3158-3573 [employer]. Care instructions adapted under license by [employer] (which disclaims liability or warranty for this information). If you have questions about a medical condition or this instruction, always ask your healthcare professional. [place] [address] any warranty or liability for your use of this information.          Fatigue: Care Instructions  Your Care Instructions  Fatigue is a feeling of tiredness, exhaustion, or lack of energy. You may feel fatigue because of too much or not enough activity. It can also come from stress, lack of sleep, boredom, and poor diet. Many medical problems, such as viral infections, can cause fatigue. Emotional problems, especially depression, are often the cause of fatigue. Fatigue is most often a symptom of another problem. Treatment for fatigue depends on the cause. For example, if you have fatigue because you have a certain health problem, treating this problem also treats your fatigue. If depression or anxiety is the cause, treatment may help. Follow-up care is a key part of your treatment and safety. Be sure to make and go to all appointments, and call your doctor if you are having problems. It's also a good idea to know your test results and keep a list of the medicines you take. How can you care for yourself at home? · Get regular exercise. But don't overdo it. Go back and forth between rest and exercise. · Get plenty of rest.  · Eat a healthy diet. Do not skip meals, especially breakfast.  · Reduce your use of caffeine, tobacco, and alcohol. Caffeine is most often found in coffee, tea, cola drinks, and chocolate. · Limit medicines that can cause fatigue. This includes tranquilizers and cold and allergy medicines. When should you call for help? Watch closely for changes in your health, and be sure to contact your doctor if:  · You have new symptoms such as fever or a rash. · Your fatigue gets worse. · You have been feeling down, depressed, or hopeless. Or you may have lost interest in things that you usually enjoy. · You are not getting better as expected. Where can you learn more? Go to http://elsa-erickson.info/. Enter T318 in the search box to learn more about \"Fatigue: Care Instructions. \"  Current as of: May 27, 2016  Content Version: 11.2  © 5744-0069 [employer].  Care instructions adapted under license by [employer] (which disclaims liability or warranty for this information). If you have questions about a medical condition or this instruction, always ask your healthcare professional. [address] any warranty or liability for your use of this information.

## 2017-05-17 NOTE — ED PROVIDER NOTES
[address]  EMERGENCY DEPARTMENT HISTORY AND PHYSICAL EXAM       Date: 5/17/2017   Patient Name: Zeina Gilbert   YOB: 1919  Medical Record Number: [id number]    History of Presenting Illness     Chief Complaint   Patient presents with    Dizziness        History Provided By:  patient    Additional History: 10:59 AM  Zeina Gilbert is a 80 y.o. female with a PMHx of HTN, seizures (last one 18 years ago), CVA, and TIA (Followed by Dr. William George, neurologist) who presents to the emergency department with her niece C/O dizziness onset 3 days ago. This morning blood pressure while sitting was 210/90. Pt explains that \"I feel shaky and feel like I need to hold onto something other than my walker to keep balanced. \" Pt reports, \"I feel like I am going to fall. \" Associated sx include light headedness, leg swelling, weakness in both legs, and fatigue. Pt blood pressure is elevated today. Pt has not had all of her medications as of yet today. Pt denies any spinning sensation. PMHx of generalized arthritis, but no abnormal pain reported. Pt denies CP, SOB, HA, coughing, appetite changes, numbness, tingling, and any other associated signs or sx. Primary Care Provider: Elvin Booker MD   Specialist:    Past History     Past Medical History:   Past Medical History:   Diagnosis Date    Arthritis     GERD (gastroesophageal reflux disease)     good control    Hypertension 50yrs    Ill-defined condition     seasonal allergies    Other ill-defined conditions(799.89)     glaucoma    Other ill-defined conditions(799.89)     bilateral pedal and ankle edema    Seizures ([address]     last seizure greater than 3 years ago.     Stroke ([address]     \"years ago, I didn't even know it\"    Thyroid disease     Unspecified adverse effect of anesthesia     hard to awaken        Past Surgical History:   Past Surgical History:   Procedure Laterality Date    HX CHOLECYSTECTOMY      HX MOHS PROCEDURES      left    HX ORTHOPAEDIC      left bunionectomy    TOTAL KNEE ARTHROPLASTY      right        Family History:   No family history on file. Social History:   Social History   Substance Use Topics    Smoking status: Former Smoker     Packs/day: 0.50     Years: 64.00     Quit date: 1/1/2014    Smokeless tobacco: Never Used      Comment: quit 2013    Alcohol use 0.5 oz/week     1 Standard drinks or equivalent per week      Comment: sip once a month        Allergies:   No Known Allergies     Review of Systems   Review of Systems   Constitutional: Positive for fatigue. Negative for appetite change. Respiratory: Negative for cough and shortness of breath. Cardiovascular: Positive for leg swelling. Negative for chest pain. Neurological: Positive for dizziness, weakness and light-headedness. Negative for numbness and headaches. (-) Weakness   All other systems reviewed and are negative. Physical Exam  Vitals:    05/17/17 1141 05/17/17 1143 05/17/17 1215 05/17/17 1300   BP: 183/71  172/74 175/71   Pulse: 69  65 67   Resp:   16 13   Temp:       SpO2:  100% 98% 100%   Weight:       Height:           Physical Exam   Nursing note and vitals reviewed. Vital signs and nursing notes reviewed. CONSTITUTIONAL: Alert. Well-appearing; well-nourished; elderly female, in no apparent distress. HEAD: Normocephalic; atraumatic. EYES: PERRL; EOM's intact. No nystagmus. Conjunctiva clear. ENT: TM's normal. External ear normal. Normal nose; no rhinorrhea. Normal pharynx. Moist mucus membranes. NECK: Supple; FROM without difficulty, non-tender; no cervical lymphadenopathy. CV: Normal S1, S2; no murmurs, rubs, or gallops. No chest wall tenderness. RESPIRATORY: Normal chest excursion with respiration; breath sounds clear and equal bilaterally; no wheezes, rhonchi, or rales. GI: Normal bowel sounds; non-distended; non-tender; no guarding or rigidity; no palpable organomegaly.  No CVA tenderness. BACK:  No evidence of trauma or deformity. Non-tender to palpation. EXT: Normal ROM in all four extremities; non-tender to palpation. SKIN: Normal for age and race; warm; dry; good turgor; no apparent lesions or exudate. NEURO: A & O x3. Cranial nerves 2-12 intact. Motor 5/5 bilaterally. Sensation intact. Normal speech. Normal coordination. Negative Romberg. PSYCH:  Mood and affect appropriate. Diagnostic Study Results     Labs -      Recent Results (from the past 12 hour(s))   EKG, 12 LEAD, INITIAL    Collection Time: 05/17/17 10:42 AM   Result Value Ref Range    Ventricular Rate 68 BPM    Atrial Rate 68 BPM    P-R Interval 166 ms    QRS Duration 82 ms    Q-T Interval 402 ms    QTC Calculation (Bezet) 427 ms    Calculated P Axis 63 degrees    Calculated R Axis -17 degrees    Calculated T Axis 9 degrees    Diagnosis       Normal sinus rhythm  Moderate voltage criteria for LVH, may be normal variant  Borderline ECG  When compared with ECG of 14-JAN-2017 20:28,  No significant change was found     CBC WITH AUTOMATED DIFF    Collection Time: 05/17/17 11:00 AM   Result Value Ref Range    WBC 5.3 4.6 - 13.2 K/uL    RBC 4.30 4.20 - 5.30 M/uL    HGB 10.7 (L) 12.0 - 16.0 g/dL    HCT 31.0 (L) 35.0 - 45.0 %    MCV 72.1 (L) 74.0 - 97.0 FL    MCH 24.9 24.0 - 34.0 PG    MCHC 34.5 31.0 - 37.0 g/dL    RDW 25.2 (H) 11.6 - 14.5 %    PLATELET 548 860 - 524 K/uL    NEUTROPHILS 80 (H) 40 - 73 %    LYMPHOCYTES 8 (L) 21 - 52 %    MONOCYTES 10 3 - 10 %    EOSINOPHILS 1 0 - 5 %    BASOPHILS 1 0 - 2 %    ABS. NEUTROPHILS 4.2 1.8 - 8.0 K/UL    ABS. LYMPHOCYTES 0.4 (L) 0.9 - 3.6 K/UL    ABS. MONOCYTES 0.5 0.05 - 1.2 K/UL    ABS. EOSINOPHILS 0.1 0.0 - 0.4 K/UL    ABS.  BASOPHILS 0.1 (H) 0.0 - 0.06 K/UL    RBC COMMENTS MICROCYTOSIS  1+        RBC COMMENTS POIKILOCYTOSIS  1+        RBC COMMENTS TARGET CELLS  1+        RBC COMMENTS OVALOCYTES  1+        RBC COMMENTS SPHEROCYTES  1+        RBC COMMENTS RBC FRAGMENTS  1+  ANISOCYTOSIS  3+        DF AUTOMATED     CARDIAC PANEL,(CK, CKMB & TROPONIN)    Collection Time: 05/17/17 11:00 AM   Result Value Ref Range    CK 46 26 - 192 U/L    CK - MB <1.0 <3.6 ng/ml    CK-MB Index Cannot be calulated 0.0 - 4.0 %    Troponin-I, Qt. <0.02 0.00 - 6.55 NG/ML   METABOLIC PANEL, COMPREHENSIVE    Collection Time: 05/17/17 11:00 AM   Result Value Ref Range    Sodium 134 (L) 136 - 145 mmol/L    Potassium 3.5 3.5 - 5.5 mmol/L    Chloride 96 (L) 100 - 108 mmol/L    CO2 29 21 - 32 mmol/L    Anion gap 9 3.0 - 18 mmol/L    Glucose 153 (H) 74 - 99 mg/dL    BUN 19 (H) 7.0 - 18 MG/DL    Creatinine 0.98 0.6 - 1.3 MG/DL    BUN/Creatinine ratio 19 12 - 20      GFR est AA >60 >60 ml/min/1.73m2    GFR est non-AA 52 (L) >60 ml/min/1.73m2    Calcium 8.9 8.5 - 10.1 MG/DL    Bilirubin, total 0.3 0.2 - 1.0 MG/DL    ALT (SGPT) 13 13 - 56 U/L    AST (SGOT) 13 (L) 15 - 37 U/L    Alk. phosphatase 138 (H) 45 - 117 U/L    Protein, total 8.7 (H) 6.4 - 8.2 g/dL    Albumin 3.7 3.4 - 5.0 g/dL    Globulin 5.0 (H) 2.0 - 4.0 g/dL    A-G Ratio 0.7 (L) 0.8 - 1.7     URINALYSIS W/ RFLX MICROSCOPIC    Collection Time: 05/17/17 11:25 AM   Result Value Ref Range    Color YELLOW      Appearance CLEAR      Specific gravity 1.007 1.005 - 1.030      pH (UA) 7.0 5.0 - 8.0      Protein NEGATIVE  NEG mg/dL    Glucose NEGATIVE  NEG mg/dL    Ketone NEGATIVE  NEG mg/dL    Bilirubin NEGATIVE  NEG      Blood NEGATIVE  NEG      Urobilinogen 0.2 0.2 - 1.0 EU/dL    Nitrites NEGATIVE  NEG      Leukocyte Esterase NEGATIVE  NEG         Radiologic Studies -  The following have been ordered and reviewed:  No orders to display           Medical Decision Making   I am the first provider for this patient. I reviewed the vital signs, available nursing notes, past medical history, past surgical history, family history and social history. Vital Signs-Reviewed the patient's vital signs.    Patient Vitals for the past 12 hrs:   Temp Pulse Resp BP SpO2   05/17/17 1300 - 67 13 175/71 100 %   05/17/17 1215 - 65 16 172/74 98 %   05/17/17 1143 - - - - 100 %   05/17/17 1141 - 69 - 183/71 -   05/17/17 1127 - 71 16 (!) 186/92 100 %   05/17/17 1036 - - - (!) 198/93 100 %   05/17/17 1012 98.1 °F (36.7 °C) 65 20 178/70 100 %       Pulse Oximetry Analysis - Normal 100% on room air     Cardiac Monitor:   Rate: 68 bpm  Rhythm: Normal Sinus Rhythm      EKG interpretation: (Preliminary)  Rhythm: NSR. Rate (approx.): 68 bpm; Moderate voltage criteria for LVH, no acute changes. EKG read by Channing Keating MD at 10:42 AM    Old Medical Records: Nursing notes. Procedures:   Procedures    ED Course:  10:59 AM  Initial assessment performed. The patients presenting problems have been discussed, and they are in agreement with the care plan formulated and outlined with them. I have encouraged them to ask questions as they arise throughout their visit. 1:00 PM Discussed patient's history, exam, and available diagnostics results with Channing Keating MD, ED Attending, who agrees to see pt.           1:02 PM FACE TO [address]  ED ATTENDING NOTE:    I have reviewed the documentation provided by the [facility] AND [facility], discussed their findings, clinical impression, and the proposed management plans with regards to this patient's encounter. I have personally evaluated the patient in the emergency department to verify the history and confirm physical findings. I have also reviewed the pertinent lab and/or radiology studies up to this point. Based on this evaluation my clinical impression is that of a patient presenting with HTN not responding to current meds. May be symptomatic from this elevation. there is no obvious evidence of a CNS effect of the high BP. Recommendations:  Specific input or recommendations to the management of the patient are as follows: there is still some room to increase the dose of Clonidine to gain better controll of the BP.    Gil March, MD        12:50 PM  Rosa evaluated pt, no further workup indicated, pt feeling well; recommends increasing Clonodine from 0.2 to 0.3 for better control of HTN and ready to be discharged home. Medications Given in the ED:  Medications   carvedilol (COREG) tablet 6.25 mg (6.25 mg Oral Given 5/17/17 1141)   cloNIDine HCl (CATAPRES) tablet 0.2 mg (0.2 mg Oral Given 5/17/17 1311)       Discharge Note:  1:01 PM  Pt has been reexamined. Patient has no new complaints, changes, or physical findings. Care plan outlined and precautions discussed. Results were reviewed with the patient. All medications were reviewed with the patient; will d/c home. All of pt's questions and concerns were addressed. Patient was instructed and agrees to follow up with PCP, as well as to return to the ED upon further deterioration. Patient is ready to go home. Diagnosis   Clinical Impression:   1. Elevated systolic blood pressure reading with diagnosis of hypertension    2. Malaise and fatigue         Follow-up Information     Follow up With Details Comments Contact Info    Fredi Bustos MD Schedule an appointment as soon as possible for a visit For Primary Care Follow Up [address] [place] [address] [address]      [facility] EMERGENCY DEPT Go to As needed, If symptoms worsen [address] [place] [address]  [phone]          Discharge Medication List as of 5/17/2017 12:58 PM      CONTINUE these medications which have CHANGED    Details   cloNIDine HCl (CATAPRES) 0.3 mg tablet Take 1 Tab by mouth two (2) times a day for 14 days. , Normal, Disp-28 Tab, R-0         CONTINUE these medications which have NOT CHANGED    Details   oxyCODONE-acetaminophen (PERCOCET) 5-325 mg per tablet Take 1 tablet by mouth every six (6) hours as needed for Pain., Historical Med      levofloxacin (LEVAQUIN) 500 mg tablet Take  by mouth daily. , Historical Med      amoxicillin (AMOXIL) 500 mg capsule Take 1,000 mg by mouth once as needed. One hour prior to dental appt, Historical Med      !! OXcarbazepine (TRILEPTAL) 300 mg tablet Take 600 mg by mouth nightly. Historical Med, 600 mg      LORATADINE (CLARITIN PO) Take 10 mg by mouth daily. , Historical Med      amLODIPine (NORVASC) 10 mg tablet Take 10 mg by mouth daily. Pt instructed to take with a small bit of water on DOSHistorical Med, 10 mg      !! OXcarbazepine (TRILEPTAL) 300 mg tablet Take 300 mg by mouth daily (after breakfast). , Historical Med      levothyroxine (SYNTHROID) 50 mcg tablet Take 50 mcg by mouth Daily (before breakfast). Pt instructed to take with a small bit of water on DOSHistorical Med, 50 mcg      carvedilol (COREG) 3.125 mg tablet Take 6.25 mg by mouth two (2) times daily (with meals). Pt instructed to take with a small bit of water on DOS, Historical Med      omeprazole (PRILOSEC) 20 mg capsule Take 20 mg by mouth daily. Pt instructed to take with a small bit of water on DOSHistorical Med, 20 mg      potassium chloride (K-DUR, KLOR-CON) 20 mEq tablet Take 20 mEq by mouth daily. Historical Med, 20 mEq      ibandronate (BONIVA) 150 mg tablet Take 150 mg by mouth every thirty (30) days. Historical Med, 150 mg      aspirin 81 mg tablet Take 81 mg by mouth daily. Historical Med, 81 mg      Cholecalciferol, Vitamin D3, (VITAMIN D3) 1,000 unit cap Take 1,000 Units by mouth daily. , Historical Med      polyethylene glycol (MIRALAX) 17 gram packet Take 17 g by mouth two (2) times a day. Historical Med, 17 g      brimonidine-timolol (COMBIGAN) 0.2-0.5 % Drop ophthalmic solution Administer 1 Drop to both eyes every twelve (12) hours. Historical Med, 1 Drop       !! - Potential duplicate medications found. Please discuss with provider.          _______________________________   Attestations: This note is prepared by Sherly Kyle, acting as a Scribe for [employer] on 10:58 AM on 5/17/2017 .     [employer], PA-C: The scribe's documentation has been prepared under my direction and personally reviewed by me in its entirety.   _______________________________

## 2017-05-26 ENCOUNTER — APPOINTMENT (OUTPATIENT)
Dept: GENERAL RADIOLOGY | Age: 82
End: 2017-05-26
Attending: EMERGENCY MEDICINE
Payer: MEDICARE

## 2017-05-26 ENCOUNTER — HOSPITAL ENCOUNTER (EMERGENCY)
Age: 82
Discharge: HOME | End: 2017-05-26
Attending: EMERGENCY MEDICINE | Admitting: EMERGENCY MEDICINE
Payer: MEDICARE

## 2017-05-26 ENCOUNTER — APPOINTMENT (OUTPATIENT)
Dept: CT IMAGING | Age: 82
End: 2017-05-26
Attending: EMERGENCY MEDICINE
Payer: MEDICARE

## 2017-05-26 VITALS
OXYGEN SATURATION: 100 % | SYSTOLIC BLOOD PRESSURE: 171 MMHG | TEMPERATURE: 98.2 F | HEART RATE: 69 BPM | HEIGHT: 66 IN | BODY MASS INDEX: 31.98 KG/M2 | DIASTOLIC BLOOD PRESSURE: 69 MMHG | WEIGHT: 199 LBS | RESPIRATION RATE: 16 BRPM

## 2017-05-26 DIAGNOSIS — R42 DIZZINESS: ICD-10-CM

## 2017-05-26 DIAGNOSIS — E87.1 HYPONATREMIA: Primary | ICD-10-CM

## 2017-05-26 LAB
ALBUMIN SERPL BCP-MCNC: 3.7 G/DL (ref 3.4–5)
ALBUMIN/GLOB SERPL: 0.7 {RATIO} (ref 0.8–1.7)
ALP SERPL-CCNC: 140 U/L (ref 45–117)
ALT SERPL-CCNC: 15 U/L (ref 13–56)
ANION GAP BLD CALC-SCNC: 10 MMOL/L (ref 3–18)
APPEARANCE UR: CLEAR
AST SERPL W P-5'-P-CCNC: 14 U/L (ref 15–37)
ATRIAL RATE: 57 BPM
BACTERIA URNS QL MICRO: ABNORMAL /HPF
BASOPHILS # BLD AUTO: 0.1 K/UL (ref 0–0.06)
BASOPHILS # BLD: 1 % (ref 0–2)
BILIRUB SERPL-MCNC: 0.3 MG/DL (ref 0.2–1)
BILIRUB UR QL: NEGATIVE
BUN SERPL-MCNC: 18 MG/DL (ref 7–18)
BUN/CREAT SERPL: 15 (ref 12–20)
CALCIUM SERPL-MCNC: 8.8 MG/DL (ref 8.5–10.1)
CALCULATED P AXIS, ECG09: 65 DEGREES
CALCULATED R AXIS, ECG10: -15 DEGREES
CALCULATED T AXIS, ECG11: 13 DEGREES
CHLORIDE SERPL-SCNC: 91 MMOL/L (ref 100–108)
CK MB CFR SERPL CALC: 3.3 % (ref 0–4)
CK MB SERPL-MCNC: 1.5 NG/ML (ref 5–25)
CK SERPL-CCNC: 46 U/L (ref 26–192)
CO2 SERPL-SCNC: 29 MMOL/L (ref 21–32)
COLOR UR: YELLOW
CREAT SERPL-MCNC: 1.22 MG/DL (ref 0.6–1.3)
DIAGNOSIS, 93000: NORMAL
DIFFERENTIAL METHOD BLD: ABNORMAL
EOSINOPHIL # BLD: 0 K/UL (ref 0–0.4)
EOSINOPHIL NFR BLD: 0 % (ref 0–5)
EPITH CASTS URNS QL MICRO: ABNORMAL /LPF (ref 0–5)
ERYTHROCYTE [DISTWIDTH] IN BLOOD BY AUTOMATED COUNT: 24.5 % (ref 11.6–14.5)
GLOBULIN SER CALC-MCNC: 5.2 G/DL (ref 2–4)
GLUCOSE SERPL-MCNC: 173 MG/DL (ref 74–99)
GLUCOSE UR STRIP.AUTO-MCNC: NEGATIVE MG/DL
HCT VFR BLD AUTO: 32.3 % (ref 35–45)
HGB BLD-MCNC: 11.1 G/DL (ref 12–16)
HGB UR QL STRIP: NEGATIVE
KETONES UR QL STRIP.AUTO: NEGATIVE MG/DL
LEUKOCYTE ESTERASE UR QL STRIP.AUTO: ABNORMAL
LYMPHOCYTES # BLD AUTO: 12 % (ref 21–52)
LYMPHOCYTES # BLD: 0.7 K/UL (ref 0.9–3.6)
MCH RBC QN AUTO: 24.5 PG (ref 24–34)
MCHC RBC AUTO-ENTMCNC: 34.4 G/DL (ref 31–37)
MCV RBC AUTO: 71.3 FL (ref 74–97)
MONOCYTES # BLD: 0.1 K/UL (ref 0.05–1.2)
MONOCYTES NFR BLD AUTO: 2 % (ref 3–10)
NEUTS SEG # BLD: 5 K/UL (ref 1.8–8)
NEUTS SEG NFR BLD AUTO: 85 % (ref 40–73)
NITRITE UR QL STRIP.AUTO: NEGATIVE
P-R INTERVAL, ECG05: 180 MS
PH UR STRIP: 6 [PH] (ref 5–8)
PLATELET # BLD AUTO: 279 K/UL (ref 135–420)
POTASSIUM SERPL-SCNC: 3.6 MMOL/L (ref 3.5–5.5)
PROT SERPL-MCNC: 8.9 G/DL (ref 6.4–8.2)
PROT UR STRIP-MCNC: NEGATIVE MG/DL
Q-T INTERVAL, ECG07: 440 MS
QRS DURATION, ECG06: 86 MS
QTC CALCULATION (BEZET), ECG08: 428 MS
RBC # BLD AUTO: 4.53 M/UL (ref 4.2–5.3)
RBC #/AREA URNS HPF: ABNORMAL /HPF (ref 0–5)
RBC MORPH BLD: ABNORMAL
SODIUM SERPL-SCNC: 130 MMOL/L (ref 136–145)
SP GR UR REFRACTOMETRY: 1.01 (ref 1–1.03)
TROPONIN I SERPL-MCNC: <0.02 NG/ML (ref 0–0.06)
UROBILINOGEN UR QL STRIP.AUTO: 0.2 EU/DL (ref 0.2–1)
VENTRICULAR RATE, ECG03: 57 BPM
WBC # BLD AUTO: 5.9 K/UL (ref 4.6–13.2)
WBC MORPH BLD: ABNORMAL
WBC URNS QL MICRO: ABNORMAL /HPF (ref 0–5)

## 2017-05-26 PROCEDURE — 93005 ELECTROCARDIOGRAM TRACING: CPT

## 2017-05-26 PROCEDURE — 82550 ASSAY OF CK (CPK): CPT | Performed by: EMERGENCY MEDICINE

## 2017-05-26 PROCEDURE — 71010 XR CHEST PORT: CPT

## 2017-05-26 PROCEDURE — 99285 EMERGENCY DEPT VISIT HI MDM: CPT

## 2017-05-26 PROCEDURE — 81001 URINALYSIS AUTO W/SCOPE: CPT | Performed by: EMERGENCY MEDICINE

## 2017-05-26 PROCEDURE — 80053 COMPREHEN METABOLIC PANEL: CPT | Performed by: EMERGENCY MEDICINE

## 2017-05-26 PROCEDURE — 70450 CT HEAD/BRAIN W/O DYE: CPT

## 2017-05-26 PROCEDURE — 96360 HYDRATION IV INFUSION INIT: CPT

## 2017-05-26 PROCEDURE — 85025 COMPLETE CBC W/AUTO DIFF WBC: CPT | Performed by: EMERGENCY MEDICINE

## 2017-05-26 PROCEDURE — 96361 HYDRATE IV INFUSION ADD-ON: CPT

## 2017-05-26 PROCEDURE — 74011250636 HC RX REV CODE- 250/636: Performed by: EMERGENCY MEDICINE

## 2017-05-26 RX ORDER — SODIUM CHLORIDE 0.9 % (FLUSH) 0.9 %
5-10 SYRINGE (ML) INJECTION EVERY 8 HOURS
Status: DISCONTINUED | OUTPATIENT
Start: 2017-05-26 | End: 2017-05-26 | Stop reason: HOSPADM

## 2017-05-26 RX ORDER — SODIUM CHLORIDE 9 MG/ML
500 INJECTION, SOLUTION INTRAVENOUS ONCE
Status: COMPLETED | OUTPATIENT
Start: 2017-05-26 | End: 2017-05-26

## 2017-05-26 RX ORDER — SODIUM CHLORIDE 0.9 % (FLUSH) 0.9 %
5-10 SYRINGE (ML) INJECTION AS NEEDED
Status: DISCONTINUED | OUTPATIENT
Start: 2017-05-26 | End: 2017-05-26 | Stop reason: HOSPADM

## 2017-05-26 RX ORDER — MECLIZINE HCL 12.5 MG 12.5 MG/1
25 TABLET ORAL
Status: COMPLETED | OUTPATIENT
Start: 2017-05-26 | End: 2017-05-26

## 2017-05-26 RX ADMIN — SODIUM CHLORIDE 500 ML: 900 INJECTION, SOLUTION INTRAVENOUS at 15:59

## 2017-05-26 RX ADMIN — MECLIZINE 25 MG: 12.5 TABLET ORAL at 15:59

## 2017-05-26 RX ADMIN — Medication 10 ML: at 15:16

## 2017-05-26 NOTE — DISCHARGE INSTRUCTIONS
Hyponatremia: Care Instructions  Your Care Instructions  Hyponatremia (say \"zz-ib-tcd-[id number]-ebonie-uh\") means that you don't have enough sodium in your blood. It can cause nausea, vomiting, and headaches. Or you may not feel hungry. In serious cases, it can cause seizures, a coma, or even death. Hyponatremia is not a disease. It is a problem caused by something else, such as medicines or exercising for a long time in hot weather. You can get hyponatremia if you lose a lot of fluids and then you drink a lot of water or other liquids that don't have much sodium. You can also get it if you have kidney, liver, heart, or other health problems. Treatment is focused on getting your sodium levels back to normal.  Follow-up care is a key part of your treatment and safety. Be sure to make and go to all appointments, and call your doctor if you are having problems. It's also a good idea to know your test results and keep a list of the medicines you take. How can you care for yourself at home? · If your doctor recommends it, drink fluids that have sodium. Sports drinks are a good choice. Or you can eat salty foods. · If your doctor recommends it, limit the amount of water you drink. And limit fluids that are mostly water. These include tea, coffee, and juice. · Take your medicines exactly as prescribed. Call your doctor if you have any problems with your medicine. · Get your sodium levels tested when your doctor tells you to. When should you call for help? Call 911 anytime you think you may need emergency care. For example, call if:  · You have a seizure. · You passed out (lost consciousness). Call your doctor now or seek immediate medical care if:  · You are confused or it is hard to focus. · You have little or no appetite. · You feel sick to your stomach or you vomit. · You have a headache. · You have mood changes.   · You feel more tired than usual.  Watch closely for changes in your health, and be sure to contact your doctor if:  · You do not get better as expected. Where can you learn more? Go to http://elsa-erickson.info/. Enter B314 in the search box to learn more about \"Hyponatremia: Care Instructions. \"  Current as of: October 14, 2016  Content Version: 11.2  © 2351-1254 [employer]. Care instructions adapted under license by [employer] (which disclaims liability or warranty for this information). If you have questions about a medical condition or this instruction, always ask your healthcare professional. [address] any warranty or liability for your use of this information. Electrolyte Imbalance: Care Instructions  Your Care Instructions  Electrolytes are minerals in your blood. They include sodium, potassium, calcium, and magnesium. When they are not at the right levels, you can feel very ill. You may not know what is causing it, but you know something is wrong. You may feel weak or numb, have muscle spasms, or twitch. Your heart may beat fast. Symptoms are different with each mineral. Too much is as bad as too little. Minerals help keep your body working as it should. Vomiting, diarrhea, and fever can cause you to lose minerals. A problem with your kidneys can tip a mineral out of balance. So can taking certain medicines. Your doctor may do more tests. He or she may change your medicine and diet. If you are low in one or more minerals, they may be given through a tube into your vein (IV). Your doctor may have you take or drink special fluids or pills. If your kidneys are failing, your blood may be filtered. This is called dialysis. It can put the minerals back in balance. Follow-up care is a key part of your treatment and safety. Be sure to make and go to all appointments, and call your doctor if you are having problems. It's also a good idea to know your test results and keep a list of the medicines you take.   How can you care for yourself at home? · Take your medicines exactly as prescribed. Call your doctor if you have any problems with your medicines. You will get more details on the specific medicines your doctor prescribes. · Do not take any medicine without talking to your doctor first. This includes prescription, over-the-counter, and herbal medicines. · If you have kidney, heart, or liver disease and have to limit fluids, talk with your doctor before you increase the amount of fluids you drink. · Your doctor or dietitian may give you a diet plan to help balance your minerals. Follow the diet carefully. When should you call for help? Call 911 anytime you think you may need emergency care. For example, call if:  · You passed out (lost consciousness). · Your heart seems to be speeding up and then slowing down or skipping beats. Call your doctor now or seek immediate medical care if:  · You are very tired and have no energy. · You have trouble thinking or concentrating. Watch closely for changes in your health, and be sure to contact your doctor if:  · You want advice on what to do to keep your minerals in balance. · You do not get better as expected. Where can you learn more? Go to http://elsa-erickson.info/. Enter T042 in the search box to learn more about \"Electrolyte Imbalance: Care Instructions. \"  Current as of: July 26, 2016  Content Version: 11.2  © 3168-1504 [employer]. Care instructions adapted under license by "[employer]" (which disclaims liability or warranty for this information). If you have questions about a medical condition or this instruction, always ask your healthcare professional. [place] [address] any warranty or liability for your use of this information.

## 2017-05-26 NOTE — ED PROVIDER NOTES
HPI Comments: 3:05 PM   Ruslan Maldonado is a 80 y.o. female with hx of seizures (last was 8 years ago), stroke, thyroid disease, and HTN presenting via EMS with neice to the ED C/O weakness and lightheadedness onset this AM. Niece reports that pt has difficulty ambulating. Pt was seen at this facility ~1 week ago for similar sx and had high blood pressure. Denies decreased appetite, nausea, and any other sx or complaints. The history is provided by the patient and a relative. No  was used. Past Medical History:   Diagnosis Date    Arthritis     GERD (gastroesophageal reflux disease)     good control    Hypertension 50yrs    Ill-defined condition     seasonal allergies    Other ill-defined conditions     glaucoma    Other ill-defined conditions     bilateral pedal and ankle edema    Seizures ([address]     last seizure greater than 3 years ago.  Stroke ([address]     \"years ago, I didn't even know it\"    Thyroid disease     Unspecified adverse effect of anesthesia     hard to awaken       Past Surgical History:   Procedure Laterality Date    HX CHOLECYSTECTOMY      HX MOHS PROCEDURES      left    HX ORTHOPAEDIC      left bunionectomy    TOTAL KNEE ARTHROPLASTY      right         History reviewed. No pertinent family history. Social History     Social History    Marital status:      Spouse name: N/A    Number of children: N/A    Years of education: N/A     Occupational History    Not on file.      Social History Main Topics    Smoking status: Former Smoker     Packs/day: 0.50     Years: 64.00     Quit date: 1/1/2014    Smokeless tobacco: Never Used      Comment: quit 2013    Alcohol use 0.5 oz/week     1 Standard drinks or equivalent per week      Comment: sip once a month    Drug use: No    Sexual activity: Not on file     Other Topics Concern    Not on file     Social History Narrative         ALLERGIES: Review of patient's allergies indicates no known allergies. Review of Systems   Constitutional: Negative for appetite change. Gastrointestinal: Negative for nausea. Neurological: Positive for weakness and light-headedness. All other systems reviewed and are negative. Vitals:    05/26/17 1435 05/26/17 1447 05/26/17 1539 05/26/17 1540   BP: 143/56  143/55 151/57   Pulse: (!) 58  60 60   Resp: 16  15 12   Temp: 98.2 °F (36.8 °C)      SpO2: 96% 96% 99% 99%   Weight: 90.3 kg (199 lb)      Height: 5' 6\" (1.676 m)               Physical Exam   Constitutional: She is oriented to person, place, and time. She appears well-developed and well-nourished. No distress. Elderly   HENT:   Head: Normocephalic and atraumatic. Eyes: Pupils are equal, round, and reactive to light. Neck: Neck supple. Cardiovascular: Normal rate, regular rhythm, S1 normal, S2 normal and normal heart sounds. Pulmonary/Chest: Breath sounds normal. No respiratory distress. She has no wheezes. She has no rales. She exhibits no tenderness. Abdominal: Soft. She exhibits no distension and no mass. There is no tenderness. There is no guarding. Musculoskeletal: Normal range of motion. She exhibits no edema or tenderness. Neurological: She is alert and oriented to person, place, and time. No cranial nerve deficit. Follows command. Generalized weakness is symmetrical.   Skin: No rash noted. Psychiatric: She has a normal mood and affect. Her behavior is normal. Thought content normal.   Nursing note and vitals reviewed. RESULTS:    Pulse Oximetry Analysis - Normal 99% on room air     EKG interpretation: (Preliminary)  Sinus bradycardia at 57 bpm. Moderate voltage criteria for LVH, may be normal variant.    EKG read by Mariia Claire MD at 2:41 PM    CT HEAD WO CONT   Final Result      XR CHEST PORT   Final Result           Labs Reviewed   CBC WITH AUTOMATED DIFF - Abnormal; Notable for the following:        Result Value    HGB 11.1 (*)     HCT 32.3 (*)     MCV 71.3 (*) RDW 24.5 (*)     NEUTROPHILS 85 (*)     LYMPHOCYTES 12 (*)     MONOCYTES 2 (*)     ABS. LYMPHOCYTES 0.7 (*)     ABS. BASOPHILS 0.1 (*)     All other components within normal limits   METABOLIC PANEL, COMPREHENSIVE - Abnormal; Notable for the following:     Sodium 130 (*)     Chloride 91 (*)     Glucose 173 (*)     GFR est AA 49 (*)     GFR est non-AA 41 (*)     AST (SGOT) 14 (*)     Alk. phosphatase 140 (*)     Protein, total 8.9 (*)     Globulin 5.2 (*)     A-G Ratio 0.7 (*)     All other components within normal limits   CARDIAC PANEL,(CK, CKMB & TROPONIN)   URINALYSIS W/ RFLX MICROSCOPIC       Recent Results (from the past 12 hour(s))   EKG, 12 LEAD, INITIAL    Collection Time: 05/26/17  2:41 PM   Result Value Ref Range    Ventricular Rate 57 BPM    Atrial Rate 57 BPM    P-R Interval 180 ms    QRS Duration 86 ms    Q-T Interval 440 ms    QTC Calculation (Bezet) 428 ms    Calculated P Axis 65 degrees    Calculated R Axis -15 degrees    Calculated T Axis 13 degrees    Diagnosis       Sinus bradycardia  Moderate voltage criteria for LVH, may be normal variant  Borderline ECG  When compared with ECG of 17-MAY-2017 10:42,  No significant change was found     CARDIAC PANEL,(CK, CKMB & TROPONIN)    Collection Time: 05/26/17  3:30 PM   Result Value Ref Range    CK 46 26 - 192 U/L    CK - MB 1.5 <3.6 ng/ml    CK-MB Index 3.3 0.0 - 4.0 %    Troponin-I, Qt. <0.02 0.00 - 0.06 NG/ML   CBC WITH AUTOMATED DIFF    Collection Time: 05/26/17  3:30 PM   Result Value Ref Range    WBC 5.9 4.6 - 13.2 K/uL    RBC 4.53 4.20 - 5.30 M/uL    HGB 11.1 (L) 12.0 - 16.0 g/dL    HCT 32.3 (L) 35.0 - 45.0 %    MCV 71.3 (L) 74.0 - 97.0 FL    MCH 24.5 24.0 - 34.0 PG    MCHC 34.4 31.0 - 37.0 g/dL    RDW 24.5 (H) 11.6 - 14.5 %    PLATELET 698 182 - 633 K/uL    NEUTROPHILS 85 (H) 40 - 73 %    LYMPHOCYTES 12 (L) 21 - 52 %    MONOCYTES 2 (L) 3 - 10 %    EOSINOPHILS 0 0 - 5 %    BASOPHILS 1 0 - 2 %    ABS. NEUTROPHILS 5.0 1.8 - 8.0 K/UL    ABS. LYMPHOCYTES 0.7 (L) 0.9 - 3.6 K/UL    ABS. MONOCYTES 0.1 0.05 - 1.2 K/UL    ABS. EOSINOPHILS 0.0 0.0 - 0.4 K/UL    ABS. BASOPHILS 0.1 (H) 0.0 - 0.06 K/UL    RBC COMMENTS ANISOCYTOSIS  3+        RBC COMMENTS OVALOCYTES  2+        RBC COMMENTS HYPOCHROMIA  1+        WBC COMMENTS ACANTHOCYTES      DF AUTOMATED     METABOLIC PANEL, COMPREHENSIVE    Collection Time: 05/26/17  3:30 PM   Result Value Ref Range    Sodium 130 (L) 136 - 145 mmol/L    Potassium 3.6 3.5 - 5.5 mmol/L    Chloride 91 (L) 100 - 108 mmol/L    CO2 29 21 - 32 mmol/L    Anion gap 10 3.0 - 18 mmol/L    Glucose 173 (H) 74 - 99 mg/dL    BUN 18 7.0 - 18 MG/DL    Creatinine 1.22 0.6 - 1.3 MG/DL    BUN/Creatinine ratio 15 12 - 20      GFR est AA 49 (L) >60 ml/min/1.73m2    GFR est non-AA 41 (L) >60 ml/min/1.73m2    Calcium 8.8 8.5 - 10.1 MG/DL    Bilirubin, total 0.3 0.2 - 1.0 MG/DL    ALT (SGPT) 15 13 - 56 U/L    AST (SGOT) 14 (L) 15 - 37 U/L    Alk. phosphatase 140 (H) 45 - 117 U/L    Protein, total 8.9 (H) 6.4 - 8.2 g/dL    Albumin 3.7 3.4 - 5.0 g/dL    Globulin 5.2 (H) 2.0 - 4.0 g/dL    A-G Ratio 0.7 (L) 0.8 - 1.7         MDM  Number of Diagnoses or Management Options  Diagnosis management comments: INITIAL CLINICAL IMPRESSION and PLANS:  The patient presents with the primary complaint(s) of: weakness and dizziness. The presentation, to include historical aspects and clinical findings are consistent with the DX of dizziness. However, other possible DX's to consider as primary, associated with, or exacerbated by include:    1. Orthostasis  2. Vertigo  3. TIA  4. Medication reaction  5.  Seizure    Considering the above, my initial management plan to evaluate and therapeutic interventions include the following as noted in the orders    Recorded by EMILY Kasper, as dictated by Rasheed Trivedi MD.         Amount and/or Complexity of Data Reviewed  Clinical lab tests: reviewed and ordered  Tests in the radiology section of CPT®: reviewed and ordered (CXR, CT Head)  Tests in the medicine section of CPT®: reviewed and ordered (EKG)  Independent visualization of images, tracings, or specimens: yes (EKG, CXR)      ED Course     Medications   sodium chloride (NS) flush 5-10 mL (10 mL IntraVENous Given 5/26/17 1516)   sodium chloride (NS) flush 5-10 mL (not administered)   sodium chloride 0.9 % bolus infusion 1,000 mL (not administered)   0.9% sodium chloride infusion 500 mL (500 mL IntraVENous New Bag 5/26/17 1559)   meclizine (ANTIVERT) tablet 25 mg (25 mg Oral Given 5/26/17 2909)       Procedures    PROGRESS NOTE:   3:05 PM  Initial assessment completed. Written by Daisy Newton ED Scribe, as dictated by Leonardo Hairston MD.     DISCHARGE NOTE:  5:15 PM  Viveca Clause  results have been reviewed with her. She has been counseled regarding her diagnosis, treatment, and plan. She verbally conveys understanding and agreement of the signs, symptoms, diagnosis, treatment and prognosis and additionally agrees to follow up as discussed. She also agrees with the care-plan and conveys that all of her questions have been answered. I have also provided discharge instructions for her that include: educational information regarding their diagnosis and treatment, and list of reasons why they would want to return to the ED prior to their follow-up appointment, should her condition change. The patient and/or family have been provided with education for proper Emergency Department utilization. CLINICAL IMPRESSION:    1.  Hyponatremia        AFTER VISIT PLAN:    Current Discharge Medication List           Follow-up Information     Follow up With Details Comments Contact Info    Vesta Rubinstein., MD Call in 3 days To make appointment for PCP follow up [address] [place] [address] [address]      [facility] EMERGENCY DEPT  As needed, If symptoms worsen [address] [place] [id number] [phone] Attestations: This note is prepared by Tory Brannon, acting as Scribe for Burton Barakat MD.    Burton Barakat MD: The scribe's documentation has been prepared under my direction and personally reviewed by me in its entirety. I confirm that the note above accurately reflects all work, treatment, procedures, and medical decision making performed by me.

## 2017-05-26 NOTE — ED TRIAGE NOTES
Patient arrived to ER via EMS with reports of increase in weakness for past week. Denies pain. No acute distress noted. Alert and oriented.

## 2017-05-27 ENCOUNTER — HOSPITAL ENCOUNTER (EMERGENCY)
Age: 82
Discharge: HOME | End: 2017-05-27
Attending: FAMILY MEDICINE | Admitting: FAMILY MEDICINE
Payer: MEDICARE

## 2017-05-27 ENCOUNTER — APPOINTMENT (OUTPATIENT)
Dept: GENERAL RADIOLOGY | Age: 82
End: 2017-05-27
Attending: FAMILY MEDICINE
Payer: MEDICARE

## 2017-05-27 VITALS
OXYGEN SATURATION: 97 % | BODY MASS INDEX: 31.99 KG/M2 | WEIGHT: 199.08 LBS | HEIGHT: 66 IN | HEART RATE: 64 BPM | DIASTOLIC BLOOD PRESSURE: 64 MMHG | RESPIRATION RATE: 19 BRPM | SYSTOLIC BLOOD PRESSURE: 153 MMHG | TEMPERATURE: 97.9 F

## 2017-05-27 DIAGNOSIS — T50.905A MEDICATION SIDE EFFECT, INITIAL ENCOUNTER: ICD-10-CM

## 2017-05-27 DIAGNOSIS — I95.1 ORTHOSTATIC HYPOTENSION: Primary | ICD-10-CM

## 2017-05-27 LAB
ANION GAP BLD CALC-SCNC: 9 MMOL/L (ref 3–18)
APPEARANCE UR: ABNORMAL
BACTERIA URNS QL MICRO: ABNORMAL /HPF
BASOPHILS # BLD AUTO: 0 K/UL (ref 0–0.06)
BASOPHILS # BLD: 0 % (ref 0–2)
BILIRUB UR QL: NEGATIVE
BUN SERPL-MCNC: 17 MG/DL (ref 7–18)
BUN/CREAT SERPL: 13 (ref 12–20)
CALCIUM SERPL-MCNC: 8.3 MG/DL (ref 8.5–10.1)
CHLORIDE SERPL-SCNC: 97 MMOL/L (ref 100–108)
CK MB CFR SERPL CALC: 2.5 % (ref 0–4)
CK MB SERPL-MCNC: 1 NG/ML (ref 5–25)
CK SERPL-CCNC: 40 U/L (ref 26–192)
CO2 SERPL-SCNC: 30 MMOL/L (ref 21–32)
COLOR UR: YELLOW
CREAT SERPL-MCNC: 1.27 MG/DL (ref 0.6–1.3)
DIFFERENTIAL METHOD BLD: ABNORMAL
EOSINOPHIL # BLD: 0 K/UL (ref 0–0.4)
EOSINOPHIL NFR BLD: 1 % (ref 0–5)
EPITH CASTS URNS QL MICRO: ABNORMAL /LPF (ref 0–5)
ERYTHROCYTE [DISTWIDTH] IN BLOOD BY AUTOMATED COUNT: 24.7 % (ref 11.6–14.5)
GLUCOSE SERPL-MCNC: 171 MG/DL (ref 74–99)
GLUCOSE UR STRIP.AUTO-MCNC: NEGATIVE MG/DL
HCT VFR BLD AUTO: 29.7 % (ref 35–45)
HGB BLD-MCNC: 10.1 G/DL (ref 12–16)
HGB UR QL STRIP: NEGATIVE
HYALINE CASTS URNS QL MICRO: ABNORMAL /LPF (ref 0–2)
KETONES UR QL STRIP.AUTO: NEGATIVE MG/DL
LEUKOCYTE ESTERASE UR QL STRIP.AUTO: ABNORMAL
LYMPHOCYTES # BLD AUTO: 7 % (ref 21–52)
LYMPHOCYTES # BLD: 0.4 K/UL (ref 0.9–3.6)
MCH RBC QN AUTO: 24.6 PG (ref 24–34)
MCHC RBC AUTO-ENTMCNC: 34 G/DL (ref 31–37)
MCV RBC AUTO: 72.4 FL (ref 74–97)
MONOCYTES # BLD: 0.4 K/UL (ref 0.05–1.2)
MONOCYTES NFR BLD AUTO: 8 % (ref 3–10)
NEUTS SEG # BLD: 4.4 K/UL (ref 1.8–8)
NEUTS SEG NFR BLD AUTO: 84 % (ref 40–73)
NITRITE UR QL STRIP.AUTO: NEGATIVE
PH UR STRIP: 5.5 [PH] (ref 5–8)
PLATELET # BLD AUTO: 260 K/UL (ref 135–420)
POTASSIUM SERPL-SCNC: 3.3 MMOL/L (ref 3.5–5.5)
PROT UR STRIP-MCNC: NEGATIVE MG/DL
RBC # BLD AUTO: 4.1 M/UL (ref 4.2–5.3)
RBC #/AREA URNS HPF: ABNORMAL /HPF (ref 0–5)
SODIUM SERPL-SCNC: 136 MMOL/L (ref 136–145)
SP GR UR REFRACTOMETRY: 1.01 (ref 1–1.03)
TROPONIN I SERPL-MCNC: 0.02 NG/ML (ref 0–0.06)
UROBILINOGEN UR QL STRIP.AUTO: 1 EU/DL (ref 0.2–1)
WBC # BLD AUTO: 5.3 K/UL (ref 4.6–13.2)
WBC URNS QL MICRO: ABNORMAL /HPF (ref 0–5)

## 2017-05-27 PROCEDURE — 85025 COMPLETE CBC W/AUTO DIFF WBC: CPT | Performed by: FAMILY MEDICINE

## 2017-05-27 PROCEDURE — 80048 BASIC METABOLIC PNL TOTAL CA: CPT | Performed by: FAMILY MEDICINE

## 2017-05-27 PROCEDURE — 99285 EMERGENCY DEPT VISIT HI MDM: CPT

## 2017-05-27 PROCEDURE — 93005 ELECTROCARDIOGRAM TRACING: CPT

## 2017-05-27 PROCEDURE — 71010 XR CHEST PORT: CPT

## 2017-05-27 PROCEDURE — 81001 URINALYSIS AUTO W/SCOPE: CPT | Performed by: FAMILY MEDICINE

## 2017-05-27 PROCEDURE — 82550 ASSAY OF CK (CPK): CPT | Performed by: FAMILY MEDICINE

## 2017-05-27 NOTE — ED NOTES
Pt resting in stretcher in NAD at this time. Pt tolerated orthostatic VS w/o difficultly. Pt able to move self around in stretcher well. Socks provided for pt. Family remains at bedside. Pt A&O x 4. Side rails raised and call light within reach. Pt remains on CCM.

## 2017-05-27 NOTE — ED NOTES
Discharge instructions reviewed with the patient and family with opportunity for questions given. The patient and family verbalized understanding. Patient armband removed and shredded. Patient in stable condition at time of discharge.

## 2017-05-27 NOTE — ED TRIAGE NOTES
Pt brought from home by EMS for complaint of fatigue and AMS. Pt seen at this ED yesterday and diagnosed with orthostatic hypotension and low sodium levels. Per family, pt normally ambulates w/o assistance but was having difficulty when she woke up this morning and became worse as day progressed. Thirty minutes prior to calling 911,  family attempted to help pt ambulate but pt was unable to and was sitting on a chair when EMS arrived. EMS reports that pt's BP was 70/53, HR  48, and pt was lethargic and alert to verbal stimulation. Pt assisted to supine position and IV started. En route to ED, pt's BP increased to 168/72, HR 58-60 with 100 mL of NS. FSBS 185 mg/dL. EMS also states that pt started a new BP medication last week. Pt w/ hx of CVA x 8 yrs ago w/ left sided deficit. Pt A&O x 4 on arrival.     Sepsis Screening completed    (  )Patient meets SIRS criteria. (X )Patient does not meet SIRS criteria.       SIRS Criteria is achieved when two or more of the following are present   Temperature < 96.8°F (36°C) or > 100.9°F (38.3°C)   Heart Rate > 90 beats per minute   Respiratory Rate > 20 breaths per minute   WBC count > 12,000 or <4,000 or > 10% bands

## 2017-05-27 NOTE — DISCHARGE INSTRUCTIONS
Side Effects of Medicine: Care Instructions  Your Care Instructions  Medicines are a big part of treatment for many health problems. But all medicines have side effects. Often these are mild problems. They might include a dry mouth or upset stomach. But sometimes medicines can cause dangerous side effects. One example is a bad allergic reaction. The best treatment will depend on what side effects you have. If you have a serious side effect, you may need to stop taking the medicine. You may also need to take another medicine to treat the side effect. If you have a mild side effect, it may go away after you take the medicine for a while. The doctor has checked you carefully, but problems can develop later. If you notice any problems or new symptoms, get medical treatment right away. Follow-up care is a key part of your treatment and safety. Be sure to make and go to all appointments, and call your doctor if you are having problems. It's also a good idea to know your test results and keep a list of the medicines you take. How can you care for yourself at home? · Be safe with medicines. Take your medicines exactly as prescribed. Call your doctor if you think you are having a problem with your medicine. · Call your doctor if side effects bother you and you wonder if you should keep taking a medicine. Your doctor may be able to lower your dose or change your medicine. Do not suddenly quit taking your medicine unless a doctor tells you to. · Make sure your doctor has a list of all the medicines, vitamins, supplements, and herbal remedies you take. Ask about side effects. When should you call for help? Call 911 anytime you think you may need emergency care. For example, call if:  · You have symptoms of a severe allergic reaction. These may include:  ¨ Sudden raised, red areas (hives) all over your body. ¨ Swelling of the throat, mouth, lips, or tongue. ¨ Trouble breathing.   ¨ Passing out (losing consciousness). Or you may feel very lightheaded or suddenly feel weak, confused, or restless. Call your doctor now or seek immediate medical care if:  · You have symptoms of an allergic reaction, such as:  ¨ A rash or hives (raised, red areas on the skin). ¨ Itching. ¨ Swelling. ¨ Belly pain, nausea, or vomiting. Watch closely for changes in your health, and be sure to contact your doctor if:  · You think you are having a new problem with your medicine. · You do not get better as expected. Where can you learn more? Go to locr.be  Enter D152 in the search box to learn more about \"Side Effects of Medicine: Care Instructions. \"   © 9641-0228 [employer], [employer]. Care instructions adapted under license by [employer] (which disclaims liability or warranty for this information). This care instruction is for use with your licensed healthcare professional. If you have questions about a medical condition or this instruction, always ask your healthcare professional. [place] [address] any warranty or liability for your use of this information. Content Version: 78.9.080525; Current as of: November 20, 2015           Orthostatic Hypotension: Care Instructions  Your Care Instructions  Orthostatic hypotension is a quick drop in blood pressure. It happens when you get up from sitting or lying down. You may feel faint, lightheaded, or dizzy. When a person sits up or stands up, the body changes the way it pumps blood. This can slow the flow of blood to the brain for a very short time. And that can make you feel lightheaded. Many medicines can cause this problem, especially in older people. Lack of fluids (dehydration) or illnesses such as diabetes or heart disease also can cause it. Follow-up care is a key part of your treatment and safety. Be sure to make and go to all appointments, and call your doctor if you are having problems.  It's also a good idea to know your test results and keep a list of the medicines you take. How can you care for yourself at home? · Tell your doctor about any problems you have with your medicines. · If your doctor prescribes medicine to help prevent a low blood pressure problem, take it exactly as prescribed. Call your doctor if you think you are having a problem with your medicine. · Drink plenty of fluids, enough so that your urine is light yellow or clear like water. Choose water and other caffeine-free clear liquids. If you have kidney, heart, or liver disease and have to limit fluids, talk with your doctor before you increase the amount of fluids you drink. · Limit or avoid alcohol and caffeine. · Get up slowly from bed or after sitting for a long time. If you are in bed, roll to your side and swing your legs over the edge of the bed and onto the floor. Push your body up to a sitting position. Wait for a while before you slowly stand up. If you are dizzy or lightheaded, sit or lie down. When should you call for help? Call 911 anytime you think you may need emergency care. For example, call if:  · You passed out (lost consciousness). Watch closely for changes in your health, and be sure to contact your doctor if:  · You do not get better as expected. Where can you learn more? Go to http://elsa-erickson.info/. Enter J130 in the search box to learn more about \"Orthostatic Hypotension: Care Instructions. \"  Current as of: January 27, 2016  Content Version: 11.2  © 7727-0518 [employer]. Care instructions adapted under license by [employer] (which disclaims liability or warranty for this information). If you have questions about a medical condition or this instruction, always ask your healthcare professional. [place] [address] any warranty or liability for your use of this information.

## 2017-05-27 NOTE — ED NOTES
Pt's niece reports that pt took all her medication this morning and began to feel weak in the time after her taking her medication to when she finished her breakfast. After eating, pt stated she needed to use the bathroom and while attempting to assist pt to the bathroom, \"thats when she collapsed and so I put her on a commode and she was not really alert when the paramedics got there, she was mumbling and moaning. \" Pt's niece states pt did not fall or hit her head and that the last time the pt went to the hospital before yesterday her Clonidine was increased from 0.2 mg to 0.3 mg.

## 2017-05-27 NOTE — ED PROVIDER NOTES
[address]  EMERGENCY DEPARTMENT HISTORY AND PHYSICAL EXAM       Date: 5/27/2017   Patient Name: Dale Miles   YOB: 1919  Medical Record Number: [id number]    History of Presenting Illness     Chief Complaint   Patient presents with    Fatigue        History Provided By:  Patient and EMS    Additional History:   2:34 PM    Dale Miles is a 80 y.o. female with pertinent PMHx of arthritis, seizures, stroke, thyroid disease, HTN, and GERD presenting via EMS to the ED c/o fatigue and weakness x 2-3 days. Per chart review, pt was seen here yesterday and diagnosed with hyponatremia and orthostatic hypotension. Pt's blood sugar was within normal limits en route to the ED, but pt was orthostatic via EMS. Pt states that she is normally able to ambulate by herself, but states that she has been unable to ambulate today. Pt states that she took both of her HTN medications today. Pt denies any new HTN medications. Per chart review, pt's Clonidine was increased yesterday, from 0.2 mg to 0.3 mg. Pt specifically denies any chest pain, SOB or N/V/D.    PCP: Марина Corado MD  Social Hx: - tobacco use, - alcohol use, - illicit drug use    There are no other complaints, changes, or physical findings at this time. Past History     Past Medical History:   Past Medical History:   Diagnosis Date    Arthritis     GERD (gastroesophageal reflux disease)     good control    Hypertension 50yrs    Ill-defined condition     seasonal allergies    Other ill-defined conditions     glaucoma    Other ill-defined conditions     bilateral pedal and ankle edema    Seizures ([address]     last seizure greater than 3 years ago.     Stroke ([address]     \"years ago, I didn't even know it\"    Thyroid disease     Unspecified adverse effect of anesthesia     hard to awaken        Past Surgical History:   Past Surgical History:   Procedure Laterality Date    HX CHOLECYSTECTOMY      HX MOHS PROCEDURES left    HX ORTHOPAEDIC      left bunionectomy    TOTAL KNEE ARTHROPLASTY      right        Family History:   History reviewed. No pertinent family history. Social History:   Social History   Substance Use Topics    Smoking status: Former Smoker     Packs/day: 0.50     Years: 64.00     Quit date: 1/1/2014    Smokeless tobacco: Never Used      Comment: quit 2013    Alcohol use 0.5 oz/week     1 Standard drinks or equivalent per week      Comment: sip once a month        Allergies:   No Known Allergies     Review of Systems   Review of Systems   Constitutional: Positive for fatigue. Gastrointestinal: Negative for abdominal pain, diarrhea, nausea and vomiting. Neurological: Positive for weakness. All other systems reviewed and are negative. Physical Exam  Vitals:    05/27/17 1500 05/27/17 1515 05/27/17 1530 05/27/17 1545   BP: 126/52 124/58 133/58 143/58   Pulse: (!) 59 61 (!) 58 (!) 59   Resp: 17 20 13 16   Temp:       SpO2: 100%  97% 100%   Weight:       Height:           Physical Exam   Nursing note and vitals reviewed. Vital signs and nursing notes reviewed    CONSTITUTIONAL: Alert, in no apparent distress; well-developed; well-nourished. Pleasant, elderly, overweight. Tired appearing. HEAD:  Normocephalic, atraumatic  EYES: PERRL; EOM's intact. ENTM: Nose: no rhinorrhea; Throat: no erythema or exudate, mucous membranes moist  Neck:  No JVD, supple without lymphadenopathy  RESP: Chest clear, equal breath sounds. CV: S1 and S2 WNL; No murmurs, gallops or rubs. GI: Normal bowel sounds, abdomen soft and non-tender. No masses or organomegaly. : No costo-vertebral angle tenderness. BACK:  Non-tender  UPPER EXT:  Normal inspection  LOWER EXT: No edema, no calf tenderness. Distal pulses intact. NEURO: CN intact, reflexes 2/4 and sym, strength 5/5 and sym, sensation intact. SKIN: No rashes; Normal for age and stage. PSYCH:  Alert and oriented, normal affect.      Diagnostic Study Results     Labs -      Recent Results (from the past 12 hour(s))   EKG, 12 LEAD, INITIAL    Collection Time: 05/27/17  2:30 PM   Result Value Ref Range    Ventricular Rate 59 BPM    Atrial Rate 59 BPM    P-R Interval 186 ms    QRS Duration 86 ms    Q-T Interval 434 ms    QTC Calculation (Bezet) 429 ms    Calculated P Axis 66 degrees    Calculated R Axis 7 degrees    Calculated T Axis 12 degrees    Diagnosis       Sinus bradycardia  Nonspecific T wave abnormality  Abnormal ECG  When compared with ECG of 26-MAY-2017 14:41,  No significant change was found     CBC WITH AUTOMATED DIFF    Collection Time: 05/27/17  3:26 PM   Result Value Ref Range    WBC 5.3 4.6 - 13.2 K/uL    RBC 4.10 (L) 4.20 - 5.30 M/uL    HGB 10.1 (L) 12.0 - 16.0 g/dL    HCT 29.7 (L) 35.0 - 45.0 %    MCV 72.4 (L) 74.0 - 97.0 FL    MCH 24.6 24.0 - 34.0 PG    MCHC 34.0 31.0 - 37.0 g/dL    RDW 24.7 (H) 11.6 - 14.5 %    PLATELET 944 533 - 294 K/uL    NEUTROPHILS 84 (H) 40 - 73 %    LYMPHOCYTES 7 (L) 21 - 52 %    MONOCYTES 8 3 - 10 %    EOSINOPHILS 1 0 - 5 %    BASOPHILS 0 0 - 2 %    ABS. NEUTROPHILS 4.4 1.8 - 8.0 K/UL    ABS. LYMPHOCYTES 0.4 (L) 0.9 - 3.6 K/UL    ABS. MONOCYTES 0.4 0.05 - 1.2 K/UL    ABS. EOSINOPHILS 0.0 0.0 - 0.4 K/UL    ABS.  BASOPHILS 0.0 0.0 - 0.06 K/UL    DF AUTOMATED     METABOLIC PANEL, BASIC    Collection Time: 05/27/17  3:26 PM   Result Value Ref Range    Sodium 136 136 - 145 mmol/L    Potassium 3.3 (L) 3.5 - 5.5 mmol/L    Chloride 97 (L) 100 - 108 mmol/L    CO2 30 21 - 32 mmol/L    Anion gap 9 3.0 - 18 mmol/L    Glucose 171 (H) 74 - 99 mg/dL    BUN 17 7.0 - 18 MG/DL    Creatinine 1.27 0.6 - 1.3 MG/DL    BUN/Creatinine ratio 13 12 - 20      GFR est AA 47 (L) >60 ml/min/1.73m2    GFR est non-AA 39 (L) >60 ml/min/1.73m2    Calcium 8.3 (L) 8.5 - 10.1 MG/DL   CARDIAC PANEL,(CK, CKMB & TROPONIN)    Collection Time: 05/27/17  3:26 PM   Result Value Ref Range    CK 40 26 - 192 U/L    CK - MB 1.0 <3.6 ng/ml    CK-MB Index 2.5 0.0 - 4.0 %    Troponin-I, Qt. 0.02 0.00 - 0.06 NG/ML   URINALYSIS W/ RFLX MICROSCOPIC    Collection Time: 05/27/17  3:55 PM   Result Value Ref Range    Color YELLOW      Appearance CLOUDY      Specific gravity 1.012 1.005 - 1.030      pH (UA) 5.5 5.0 - 8.0      Protein NEGATIVE  NEG mg/dL    Glucose NEGATIVE  NEG mg/dL    Ketone NEGATIVE  NEG mg/dL    Bilirubin NEGATIVE  NEG      Blood NEGATIVE  NEG      Urobilinogen 1.0 0.2 - 1.0 EU/dL    Nitrites NEGATIVE  NEG      Leukocyte Esterase LARGE (A) NEG     URINE MICROSCOPIC ONLY    Collection Time: 05/27/17  3:55 PM   Result Value Ref Range    WBC 6 to 10 0 - 5 /hpf    RBC 6 to 10 0 - 5 /hpf    Epithelial cells FEW 0 - 5 /lpf    Bacteria 1+ (A) NEG /hpf    Hyaline cast 0 to 3 0 - 2 /lpf       Radiologic Studies -  The following have been ordered and reviewed:  XR CHEST PORT   Final Result      IMPRESSION  Impression:  Mild pulmonary hypoinflation, without superimposed acute radiographic  Abnormality. Medical Decision Making   I am the first provider for this patient. I reviewed the vital signs, available nursing notes, past medical history, past surgical history, family history and social history. Vital Signs-Reviewed the patient's vital signs. Patient Vitals for the past 12 hrs:   Temp Pulse Resp BP SpO2   05/27/17 1545 - (!) 59 16 143/58 100 %   05/27/17 1530 - (!) 58 13 133/58 97 %   05/27/17 1515 - 61 20 124/58 -   05/27/17 1500 - (!) 59 17 126/52 100 %   05/27/17 1434 97.9 °F (36.6 °C) (!) 58 17 150/60 99 %       Pulse Oximetry Analysis - Normal 100% on room air, at 2:40 PM    Cardiac Monitor:   Rate: 55 bpm  Rhythm: Sinus Bradycardia   Time: 2:40 PM    EKG interpretation: (Preliminary)  Rhythm: sinus bradycardia. Rate (approx.): 59 bpm; non-specific T-wave abnormality   EKG read by Brandie Raygoza MD at [address] Records: Old medical records. Previous electrocardiograms. Nursing notes. Ambulance run sheet.      Provider Notes: INITIAL CLINICAL IMPRESSION and PLANS:  The patient presents with the primary complaint(s) of: fatigue/weakness. The presentation, to include historical aspects and clinical findings are consistent with the DX of orthostatic hypotension. However, other possible DX's to consider as primary, associated with, or exacerbated by include:    1. Side effect of medication  2. UTI  3. Hyponatremia    Considering the above, my initial management plan to evaluate and therapeutic interventions include the following and as noted in the orders:    1. Labs: CBC, CMP, UA, cardiac panel  2. Imaging: chest XR, EKG      Procedures:   Procedures    ED Course:  2:34 PM  Initial assessment performed. The patients presenting problems have been discussed, and they are in agreement with the care plan formulated and outlined with them. I have encouraged them to ask questions as they arise throughout their visit. 4:57 PM - Pt is feeling better. Her vitals and orthostatics were stable. Pt is ready for discharge. Medications Given in the ED:  Medications - No data to display    Discharge Note:  4:58 PM  Pt has been reexamined. Patient has no new complaints, changes, or physical findings. Care plan outlined and precautions discussed. Results were reviewed with the patient. All medications were reviewed with the patient; will d/c home. All of pt's questions and concerns were addressed. Patient was instructed and agrees to follow up with PCP, as well as to return to the ED upon further deterioration. Patient is ready to go home. Diagnosis   Clinical Impression:   1. Orthostatic hypotension    2. Medication side effect, initial encounter         Discussion:  80 y.o.  female with episode of near syncope today. Pt took her blood pressure medications a few hours PTA. Pts Clonidine has recently been increased. Pt was seen in the ED yesterday with similar symptoms and diagnosed with orthostatic hypotension and mild hyponatremia.  Pt's vitals were stable. Pt has no neurologic deficits. Pts labs have improved since yesterday. Will decrease her Clonidine dose and send her home with PCP follow up    Follow-up Information     Follow up With Details Comments Contact Madelaine Murphy MD Schedule an appointment as soon as possible for a visit in 2 days for PCP follow up [phone]  [phone]      [facility] EMERGENCY [facility]  As needed, If symptoms worsen [address] [place]  [phone]          Current Discharge Medication List          _______________________________   Attestation: This note is prepared by Erika Rodriguez, acting as Scribe for Vinod Reyes MD; at 2:34 PM on 5/27/2017. Vinod Reyes MD: The scribe's documentation has been prepared under my direction and personally reviewed by me in its entirety.  I confirm that the note above accurately reflects all work, treatment, procedures, and medical decision making performed by me.     _______________________________

## 2017-05-29 LAB
ATRIAL RATE: 59 BPM
CALCULATED P AXIS, ECG09: 66 DEGREES
CALCULATED R AXIS, ECG10: 7 DEGREES
CALCULATED T AXIS, ECG11: 12 DEGREES
DIAGNOSIS, 93000: NORMAL
P-R INTERVAL, ECG05: 186 MS
Q-T INTERVAL, ECG07: 434 MS
QRS DURATION, ECG06: 86 MS
QTC CALCULATION (BEZET), ECG08: 429 MS
VENTRICULAR RATE, ECG03: 59 BPM

## 2017-09-28 ENCOUNTER — HOSPITAL ENCOUNTER (OUTPATIENT)
Dept: CT IMAGING | Age: 82
Discharge: HOME | End: 2017-09-28
Attending: OTOLARYNGOLOGY
Payer: MEDICARE

## 2017-09-28 DIAGNOSIS — J32.9 UNSPECIFIED SINUSITIS (CHRONIC): ICD-10-CM

## 2017-09-28 PROCEDURE — 70486 CT MAXILLOFACIAL W/O DYE: CPT

## 2019-02-01 ENCOUNTER — HOSPITAL ENCOUNTER (OUTPATIENT)
Dept: LAB | Age: 84
Discharge: HOME | End: 2019-02-01
Attending: PSYCHIATRY & NEUROLOGY
Payer: MEDICARE

## 2019-02-01 PROCEDURE — 36415 COLL VENOUS BLD VENIPUNCTURE: CPT

## 2019-02-01 PROCEDURE — 80183 DRUG SCRN QUANT OXCARBAZEPIN: CPT

## 2019-02-04 LAB — OXCARBAZEPINE SERPL-MCNC: 29 UG/ML (ref 10–35)

## 2019-02-05 LAB
FAX TO INFO,FAXT: NORMAL
FAX TO NUMBER,FAXN: NORMAL

## 2019-02-25 ENCOUNTER — HOSPITAL ENCOUNTER (EMERGENCY)
Age: 84
Discharge: HOME | End: 2019-02-25
Attending: EMERGENCY MEDICINE | Admitting: EMERGENCY MEDICINE
Payer: MEDICARE

## 2019-02-25 VITALS
SYSTOLIC BLOOD PRESSURE: 209 MMHG | OXYGEN SATURATION: 100 % | HEIGHT: 64 IN | HEART RATE: 64 BPM | RESPIRATION RATE: 18 BRPM | TEMPERATURE: 98.6 F | DIASTOLIC BLOOD PRESSURE: 82 MMHG | BODY MASS INDEX: 31.58 KG/M2 | WEIGHT: 185 LBS

## 2019-02-25 DIAGNOSIS — I10 ASYMPTOMATIC HYPERTENSION: Primary | ICD-10-CM

## 2019-02-25 PROCEDURE — 74011250637 HC RX REV CODE- 250/637: Performed by: EMERGENCY MEDICINE

## 2019-02-25 PROCEDURE — 99283 EMERGENCY DEPT VISIT LOW MDM: CPT

## 2019-02-25 RX ORDER — CARVEDILOL 12.5 MG/1
6.25 TABLET ORAL ONCE
Status: COMPLETED | OUTPATIENT
Start: 2019-02-25 | End: 2019-02-25

## 2019-02-25 RX ORDER — AMLODIPINE BESYLATE 5 MG/1
10 TABLET ORAL
Status: COMPLETED | OUTPATIENT
Start: 2019-02-25 | End: 2019-02-25

## 2019-02-25 RX ADMIN — AMLODIPINE BESYLATE 10 MG: 5 TABLET ORAL at 11:19

## 2019-02-25 RX ADMIN — CARVEDILOL 6.25 MG: 12.5 TABLET, FILM COATED ORAL at 11:19

## 2019-02-25 NOTE — ED PROVIDER NOTES
EMERGENCY DEPARTMENT HISTORY AND PHYSICAL EXAM 
 
Date: 2/25/2019 Patient Name: Zoraida Lopez History of Presenting Illness Chief Complaint Patient presents with  Hypertension History Provided By: Patient Chief Complaint: High blood pressure Duration: 1 Days Timing:  Acute Modifying Factors: Pt did not take her BP medication today Associated Symptoms: denies any other associated signs or symptoms Additional History (Context):  
10:58 AM 
Zoraida Lopez is a 80 y.o. female with PMHX of Arthritis, HTN, GERD who presents to the emergency department C/O high blood pressure onset today. Pt was at the orthodontist for an evaluation, saw that her BP was high and sent her here. Pt did not take her BP medication this morning. Pt states no associated sxs. Pt's daughter notes she had a cold a few weeks ago, took Claritin but did not see her PCP. Pt states she last saw Dr. Sorensen in Oct 2018 and she is going to see him again soon. Pt denies CP, SOB, abd, vomiting, vision changes, gait problems and any other sxs or complaints. PCP: Nely Trevino, DO 
 
Current Outpatient Medications Medication Sig Dispense Refill  oxyCODONE-acetaminophen (PERCOCET) 5-325 mg per tablet Take 1 tablet by mouth every six (6) hours as needed for Pain.  levofloxacin (LEVAQUIN) 500 mg tablet Take  by mouth daily.  amoxicillin (AMOXIL) 500 mg capsule Take 1,000 mg by mouth once as needed. One hour prior to dental appt  OXcarbazepine (TRILEPTAL) 300 mg tablet Take 600 mg by mouth nightly.  LORATADINE (CLARITIN PO) Take 10 mg by mouth daily.  amLODIPine (NORVASC) 10 mg tablet Take 10 mg by mouth daily. Pt instructed to take with a small bit of water on DOS    
 OXcarbazepine (TRILEPTAL) 300 mg tablet Take 300 mg by mouth daily (after breakfast).     
 levothyroxine (SYNTHROID) 50 mcg tablet Take 50 mcg by mouth Daily (before breakfast). Pt instructed to take with a small bit of water on DOS  carvedilol (COREG) 3.125 mg tablet Take 6.25 mg by mouth two (2) times daily (with meals). Pt instructed to take with a small bit of water on DOS  omeprazole (PRILOSEC) 20 mg capsule Take 20 mg by mouth daily. Pt instructed to take with a small bit of water on DOS  potassium chloride (K-DUR, KLOR-CON) 20 mEq tablet Take 20 mEq by mouth daily.  ibandronate (BONIVA) 150 mg tablet Take 150 mg by mouth every thirty (30) days.  aspirin 81 mg tablet Take 81 mg by mouth daily.  Cholecalciferol, Vitamin D3, (VITAMIN D3) 1,000 unit cap Take 1,000 Units by mouth daily.  polyethylene glycol (MIRALAX) 17 gram packet Take 17 g by mouth two (2) times a day.  brimonidine-timolol (COMBIGAN) 0.2-0.5 % Drop ophthalmic solution Administer 1 Drop to both eyes every twelve (12) hours. Past History Past Medical History: 
Past Medical History:  
Diagnosis Date  Arthritis  GERD (gastroesophageal reflux disease)   
 good control  Hypertension 50yrs  Ill-defined condition   
 seasonal allergies  Other ill-defined conditions(799.89)   
 glaucoma  Other ill-defined conditions(799.89)   
 bilateral pedal and ankle edema  Seizures ([address]   
 last seizure greater than 3 years ago.  Stroke ([address] \"years ago, I didn't even know it\"  Thyroid disease  Unspecified adverse effect of anesthesia   
 hard to awaken Past Surgical History: 
Past Surgical History:  
Procedure Laterality Date  HX CHOLECYSTECTOMY  HX MOHS PROCEDURES    
 left  HX ORTHOPAEDIC    
 left bunionectomy  TOTAL KNEE ARTHROPLASTY    
 right Family History: 
History reviewed. No pertinent family history. Social History: 
Social History Tobacco Use  Smoking status: Former Smoker Packs/day: 0.50 Years: 64.00 Pack years: 32.00 Last attempt to quit: 1/1/2014 Years since quittin.1  Smokeless tobacco: Never Used  Tobacco comment: quit 2013 Substance Use Topics  Alcohol use: Yes Alcohol/week: 0.5 oz Types: 1 Standard drinks or equivalent per week Comment: sip once a month  Drug use: No  
 
 
Allergies: 
No Known Allergies Review of Systems Review of Systems Constitutional: Negative for chills and fever. HENT: Negative for congestion, ear pain, sinus pain and sore throat. Eyes: Negative for visual disturbance. Respiratory: Negative for cough and shortness of breath. Cardiovascular: Negative for chest pain and leg swelling. Gastrointestinal: Negative for abdominal pain, constipation, diarrhea, nausea and vomiting. Genitourinary: Negative for dysuria, hematuria, vaginal bleeding and vaginal discharge. Musculoskeletal: Negative for gait problem. All other systems reviewed and are negative. Physical Exam  
 
Vitals:  
 19 1020 BP: (!) 202/76 Pulse: 68 Resp: 18 Temp: 98.6 °F (37 °C) SpO2: 100% Weight: 83.9 kg (185 lb) Height: 5' 4\" (1.626 m) Physical Exam  
Nursing note and vitals reviewed. Constitutional: Elderly  female, well appearing, no acute distress Head: Normocephalic, Atraumatic Eyes: Pupils are equal, round, and reactive to light, EOMI, no scleral icterus, no conjunctival pallor ENT: moist mucous membranes, hearing intact Neck: Supple, non-tender Cardiovascular: Regular rate and rhythm, no murmurs, rubs, or gallops Chest: Normal work of breathing and chest excursion bilaterally Lungs: Clear to ausculation bilaterally, no wheezes, no rhonchi Abdomen: Soft, non tender, non distended, normoactive bowel sounds Back: No evidence of trauma or deformity Extremities: No evidence of trauma or deformity, no LE edema Skin: Warm and dry, normal cap refill Neuro: Alert and appropriate, CN intact, normal speech, moving all 4 extremities freely and symmetrically, negative Romberg, no ataxia of her BL upper and lower extremities Psychiatric: Cooperative, appropriate mood Diagnostic Study Results Labs - No results found for this or any previous visit (from the past 12 hour(s)). Radiologic Studies - No orders to display CT Results  (Last 48 hours) None CXR Results  (Last 48 hours) None Medications given in the ED- Medications - No data to display Medical Decision Making I am the first provider for this patient. I reviewed the vital signs, available nursing notes, past medical history, past surgical history, family history and social history. Vital Signs-Reviewed the patient's vital signs. Pulse Oximetry Analysis - 100% on Room Air Records Reviewed: Nursing Notes and Old Medical Records Provider Notes (Medical Decision Making): 79 y/o female presenting with asymptomatic HTN from her dentist. On exam, she is well-appearing, NAD, but does have noted high BP of 202/76. She has no neurological complaints and she is neurologically intact. There is no indication for lab work or imaging with asymptomatic HTN. Pt will be given her home doses of antiuretic and discharged home to follow up with her PCP. Procedures: 
Procedures ED Course:  
10:58 AM  
Initial assessment performed. The patients presenting problems have been discussed, and they are in agreement with the care plan formulated and outlined with them. I have encouraged them to ask questions as they arise throughout their visit. Diagnosis and Disposition DISCHARGE NOTE: 
11:06 AM 
Anand Corley's  results have been reviewed with her. She has been counseled regarding her diagnosis, treatment, and plan. She verbally conveys understanding and agreement of the signs, symptoms, diagnosis, treatment and prognosis and additionally agrees to follow up as discussed. She also agrees with the care-plan and conveys that all of her questions have been answered. I have also provided discharge instructions for her that include: educational information regarding their diagnosis and treatment, and list of reasons why they would want to return to the ED prior to their follow-up appointment, should her condition change. She has been provided with education for proper emergency department utilization. CLINICAL IMPRESSION: 
 
No diagnosis found. PLAN: 
1. D/C Home 2. Current Discharge Medication List  
  
 
3. Follow-up Information None 
  
 
_______________________________ Attestations: This note is prepared by Alfredo Balderrama, acting as Scribe for General Myles DO. General Myles DO:  The scribe's documentation has been prepared under my direction and personally reviewed by me in its entirety. I confirm that the note above accurately reflects all work, treatment, procedures, and medical decision making performed by me. 
_______________________________

## 2019-02-25 NOTE — ED TRIAGE NOTES
Patient arrives from the dentist with c/o high blood pressure. Patient was at the dentist to have a procedure done, but her BP was too high. 267/123, 268/121, and 270/114 were the readings provided by the dentist. Patient reports not having taken her BP meds this morning.

## 2019-02-25 NOTE — ED NOTES
Care assumed for discharge. Pt and family member given discharge instructions and verbalizes understanding. Patient armband removed and shredded. Pt discharged home ambulatory with walker with family member, no distress noted. No complaints voiced.

## 2019-02-25 NOTE — DISCHARGE INSTRUCTIONS
High Blood Pressure: Care Instructions  Overview    It's normal for blood pressure to go up and down throughout the day. But if it stays up, you have high blood pressure. Another name for high blood pressure is hypertension. Despite what a lot of people think, high blood pressure usually doesn't cause headaches or make you feel dizzy or lightheaded. It usually has no symptoms. But it does increase your risk of stroke, heart attack, and other problems. You and your doctor will talk about your risks of these problems based on your blood pressure. Your doctor will give you a goal for your blood pressure. Your goal will be based on your health and your age. Lifestyle changes, such as eating healthy and being active, are always important to help lower blood pressure. You might also take medicine to reach your blood pressure goal.  Follow-up care is a key part of your treatment and safety. Be sure to make and go to all appointments, and call your doctor if you are having problems. It's also a good idea to know your test results and keep a list of the medicines you take. How can you care for yourself at home? Medical treatment  · If you stop taking your medicine, your blood pressure will go back up. You may take one or more types of medicine to lower your blood pressure. Be safe with medicines. Take your medicine exactly as prescribed. Call your doctor if you think you are having a problem with your medicine. · Talk to your doctor before you start taking aspirin every day. Aspirin can help certain people lower their risk of a heart attack or stroke. But taking aspirin isn't right for everyone, because it can cause serious bleeding. · See your doctor regularly. You may need to see the doctor more often at first or until your blood pressure comes down. · If you are taking blood pressure medicine, talk to your doctor before you take decongestants or anti-inflammatory medicine, such as ibuprofen.  Some of these medicines can raise blood pressure. · Learn how to check your blood pressure at home. Lifestyle changes  · Stay at a healthy weight. This is especially important if you put on weight around the waist. Losing even 10 pounds can help you lower your blood pressure. · If your doctor recommends it, get more exercise. Walking is a good choice. Bit by bit, increase the amount you walk every day. Try for at least 30 minutes on most days of the week. You also may want to swim, bike, or do other activities. · Avoid or limit alcohol. Talk to your doctor about whether you can drink any alcohol. · Try to limit how much sodium you eat to less than 2,300 milligrams (mg) a day. Your doctor may ask you to try to eat less than 1,500 mg a day. · Eat plenty of fruits (such as bananas and oranges), vegetables, legumes, whole grains, and low-fat dairy products. · Lower the amount of saturated fat in your diet. Saturated fat is found in animal products such as milk, cheese, and meat. Limiting these foods may help you lose weight and also lower your risk for heart disease. · Do not smoke. Smoking increases your risk for heart attack and stroke. If you need help quitting, talk to your doctor about stop-smoking programs and medicines. These can increase your chances of quitting for good. When should you call for help? Call 911 anytime you think you may need emergency care. This may mean having symptoms that suggest that your blood pressure is causing a serious heart or blood vessel problem. Your blood pressure may be over 180/120.   For example, call 911 if:    · You have symptoms of a heart attack. These may include:  ? Chest pain or pressure, or a strange feeling in the chest.  ? Sweating. ? Shortness of breath. ? Nausea or vomiting. ? Pain, pressure, or a strange feeling in the back, neck, jaw, or upper belly or in one or both shoulders or arms. ? Lightheadedness or sudden weakness.   ? A fast or irregular heartbeat.     · You have symptoms of a stroke. These may include:  ? Sudden numbness, tingling, weakness, or loss of movement in your face, arm, or leg, especially on only one side of your body. ? Sudden vision changes. ? Sudden trouble speaking. ? Sudden confusion or trouble understanding simple statements. ? Sudden problems with walking or balance. ? A sudden, severe headache that is different from past headaches.     · You have severe back or belly pain.    Do not wait until your blood pressure comes down on its own. Get help right away.   Call your doctor now or seek immediate care if:    · Your blood pressure is much higher than normal (such as 180/120 or higher), but you don't have symptoms.     · You think high blood pressure is causing symptoms, such as:  ? Severe headache.  ? Blurry vision.    Watch closely for changes in your health, and be sure to contact your doctor if:    · Your blood pressure measures higher than your doctor recommends at least 2 times. That means the top number is higher or the bottom number is higher, or both.     · You think you may be having side effects from your blood pressure medicine. Where can you learn more? Go to http://elsa-erickson.info/. Enter X692 in the search box to learn more about \"High Blood Pressure: Care Instructions. \"  Current as of: July 22, 2018  Content Version: 11.9  © 3091-7473 [employer]. Care instructions adapted under license by [employer] (which disclaims liability or warranty for this information). If you have questions about a medical condition or this instruction, always ask your healthcare professional. [place] [address] any warranty or liability for your use of this information.